# Patient Record
Sex: MALE
[De-identification: names, ages, dates, MRNs, and addresses within clinical notes are randomized per-mention and may not be internally consistent; named-entity substitution may affect disease eponyms.]

---

## 2022-07-06 ENCOUNTER — NURSE TRIAGE (OUTPATIENT)
Dept: OTHER | Facility: CLINIC | Age: 62
End: 2022-07-06

## 2022-07-06 NOTE — TELEPHONE ENCOUNTER
Subjective: Caller states \"I am scheduled to have a colonoscopy this morning. And the prep has not worked. \"     Colonoscopy scheduled for 0645. Notified physician @ 2200, suggested mag citrate. Drank that around 45 minutes-1 hour ago and 16oz water, 10oz clear chicken broth. Second prep is supposed to start 0230 - 0430     Current Symptoms: N/A    Onset: 1630 7/5/22 initiated prep    Associated Symptoms: constipation    Pain Severity: N/A    Temperature: N/A     What has been tried: mag citrate     LMP: NA Pregnant: NA    Recommended disposition: call pcp     Care advice provided, patient verbalizes understanding; denies any other questions or concerns; instructed to call back for any new or worsening symptoms. Patient/caller agrees to follow-up with PCP     This triage is a result of a call to [address] [place] [address]. Please do not respond to the triage nurse through this encounter. Any subsequent communication should be directly with the patient.     Reason for Disposition   Bowel prep for colonoscopy, questions about   [1] Caller has URGENT question or concern AND [2] triager unable to answer question    Protocols used: COLONOSCOPY SYMPTOMS AND QUESTIONS-ADULT-

## 2023-03-06 DIAGNOSIS — E11.9 TYPE 2 DIABETES MELLITUS WITHOUT COMPLICATION, WITHOUT LONG-TERM CURRENT USE OF INSULIN (MULTI): Primary | ICD-10-CM

## 2023-03-06 RX ORDER — DULAGLUTIDE 3 MG/.5ML
3 INJECTION, SOLUTION SUBCUTANEOUS
Qty: 6 ML | Refills: 3 | Status: SHIPPED | OUTPATIENT
Start: 2023-03-06 | End: 2023-10-31 | Stop reason: ALTCHOICE

## 2023-04-26 ENCOUNTER — OFFICE VISIT (OUTPATIENT)
Dept: PRIMARY CARE | Facility: CLINIC | Age: 63
End: 2023-04-26
Payer: MEDICARE

## 2023-04-26 ENCOUNTER — APPOINTMENT (OUTPATIENT)
Dept: LAB | Facility: LAB | Age: 63
End: 2023-04-26
Payer: MEDICARE

## 2023-04-26 VITALS
TEMPERATURE: 98.3 F | DIASTOLIC BLOOD PRESSURE: 65 MMHG | BODY MASS INDEX: 45.07 KG/M2 | HEIGHT: 64 IN | WEIGHT: 264 LBS | SYSTOLIC BLOOD PRESSURE: 124 MMHG | HEART RATE: 96 BPM

## 2023-04-26 DIAGNOSIS — R42 POSITIONAL LIGHTHEADEDNESS: Primary | ICD-10-CM

## 2023-04-26 DIAGNOSIS — R00.2 PALPITATIONS: ICD-10-CM

## 2023-04-26 PROBLEM — E66.01 MORBID OBESITY (MULTI): Status: ACTIVE | Noted: 2023-04-26

## 2023-04-26 PROBLEM — G47.33 OBSTRUCTIVE SLEEP APNEA: Status: ACTIVE | Noted: 2023-04-26

## 2023-04-26 PROBLEM — G47.01 INSOMNIA DUE TO MEDICAL CONDITION: Status: ACTIVE | Noted: 2023-04-26

## 2023-04-26 PROBLEM — N52.9 ERECTILE DYSFUNCTION: Status: ACTIVE | Noted: 2023-04-26

## 2023-04-26 PROBLEM — E78.00 HYPERCHOLESTEROLEMIA: Status: ACTIVE | Noted: 2023-04-26

## 2023-04-26 PROBLEM — G20.A1 PARKINSON'S DISEASE (MULTI): Status: ACTIVE | Noted: 2023-04-26

## 2023-04-26 PROBLEM — E11.9 DIABETES MELLITUS, TYPE 2 (MULTI): Status: ACTIVE | Noted: 2023-04-26

## 2023-04-26 PROBLEM — L71.9 ROSACEA: Status: ACTIVE | Noted: 2023-04-26

## 2023-04-26 PROBLEM — D12.6 ADENOMATOUS POLYP OF COLON: Status: ACTIVE | Noted: 2023-04-26

## 2023-04-26 PROBLEM — M54.16 LUMBAR RADICULOPATHY, CHRONIC: Status: ACTIVE | Noted: 2023-04-26

## 2023-04-26 LAB
ANION GAP IN SER/PLAS: 11 MMOL/L (ref 10–20)
BASOPHILS (10*3/UL) IN BLOOD BY AUTOMATED COUNT: 0.03 X10E9/L (ref 0–0.1)
BASOPHILS/100 LEUKOCYTES IN BLOOD BY AUTOMATED COUNT: 0.4 % (ref 0–2)
CALCIUM (MG/DL) IN SER/PLAS: 9.7 MG/DL (ref 8.6–10.6)
CARBON DIOXIDE, TOTAL (MMOL/L) IN SER/PLAS: 31 MMOL/L (ref 21–32)
CHLORIDE (MMOL/L) IN SER/PLAS: 102 MMOL/L (ref 98–107)
CREATININE (MG/DL) IN SER/PLAS: 1.35 MG/DL (ref 0.5–1.3)
EOSINOPHILS (10*3/UL) IN BLOOD BY AUTOMATED COUNT: 0.09 X10E9/L (ref 0–0.7)
EOSINOPHILS/100 LEUKOCYTES IN BLOOD BY AUTOMATED COUNT: 1.3 % (ref 0–6)
ERYTHROCYTE DISTRIBUTION WIDTH (RATIO) BY AUTOMATED COUNT: 13.7 % (ref 11.5–14.5)
ERYTHROCYTE MEAN CORPUSCULAR HEMOGLOBIN CONCENTRATION (G/DL) BY AUTOMATED: 32.9 G/DL (ref 32–36)
ERYTHROCYTE MEAN CORPUSCULAR VOLUME (FL) BY AUTOMATED COUNT: 91 FL (ref 80–100)
ERYTHROCYTES (10*6/UL) IN BLOOD BY AUTOMATED COUNT: 5.4 X10E12/L (ref 4.5–5.9)
GFR MALE: 59 ML/MIN/1.73M2
GLUCOSE (MG/DL) IN SER/PLAS: 74 MG/DL (ref 74–99)
HEMATOCRIT (%) IN BLOOD BY AUTOMATED COUNT: 49.3 % (ref 41–52)
HEMOGLOBIN (G/DL) IN BLOOD: 16.2 G/DL (ref 13.5–17.5)
IMMATURE GRANULOCYTES/100 LEUKOCYTES IN BLOOD BY AUTOMATED COUNT: 0.3 % (ref 0–0.9)
LEUKOCYTES (10*3/UL) IN BLOOD BY AUTOMATED COUNT: 7.1 X10E9/L (ref 4.4–11.3)
LYMPHOCYTES (10*3/UL) IN BLOOD BY AUTOMATED COUNT: 1.58 X10E9/L (ref 1.2–4.8)
LYMPHOCYTES/100 LEUKOCYTES IN BLOOD BY AUTOMATED COUNT: 22.3 % (ref 13–44)
MONOCYTES (10*3/UL) IN BLOOD BY AUTOMATED COUNT: 0.85 X10E9/L (ref 0.1–1)
MONOCYTES/100 LEUKOCYTES IN BLOOD BY AUTOMATED COUNT: 12 % (ref 2–10)
NEUTROPHILS (10*3/UL) IN BLOOD BY AUTOMATED COUNT: 4.53 X10E9/L (ref 1.2–7.7)
NEUTROPHILS/100 LEUKOCYTES IN BLOOD BY AUTOMATED COUNT: 63.7 % (ref 40–80)
NRBC (PER 100 WBCS) BY AUTOMATED COUNT: 0 /100 WBC (ref 0–0)
PLATELETS (10*3/UL) IN BLOOD AUTOMATED COUNT: 180 X10E9/L (ref 150–450)
POTASSIUM (MMOL/L) IN SER/PLAS: 4 MMOL/L (ref 3.5–5.3)
SODIUM (MMOL/L) IN SER/PLAS: 140 MMOL/L (ref 136–145)
UREA NITROGEN (MG/DL) IN SER/PLAS: 25 MG/DL (ref 6–23)

## 2023-04-26 PROCEDURE — 3074F SYST BP LT 130 MM HG: CPT | Performed by: INTERNAL MEDICINE

## 2023-04-26 PROCEDURE — 36415 COLL VENOUS BLD VENIPUNCTURE: CPT

## 2023-04-26 PROCEDURE — 85025 COMPLETE CBC W/AUTO DIFF WBC: CPT

## 2023-04-26 PROCEDURE — 80048 BASIC METABOLIC PNL TOTAL CA: CPT

## 2023-04-26 PROCEDURE — 3078F DIAST BP <80 MM HG: CPT | Performed by: INTERNAL MEDICINE

## 2023-04-26 PROCEDURE — 93000 ELECTROCARDIOGRAM COMPLETE: CPT | Performed by: INTERNAL MEDICINE

## 2023-04-26 PROCEDURE — 99214 OFFICE O/P EST MOD 30 MIN: CPT | Performed by: INTERNAL MEDICINE

## 2023-04-26 RX ORDER — PRAVASTATIN SODIUM 40 MG/1
40 TABLET ORAL DAILY
COMMUNITY
End: 2023-06-12 | Stop reason: SDUPTHER

## 2023-04-26 RX ORDER — PRAMIPEXOLE DIHYDROCHLORIDE 0.5 MG/1
1 TABLET ORAL 3 TIMES DAILY
COMMUNITY
Start: 2021-04-06

## 2023-04-26 RX ORDER — TADALAFIL 5 MG/1
5 TABLET ORAL DAILY PRN
COMMUNITY

## 2023-04-26 RX ORDER — CHROMIUM PICOLINATE 200 MCG
1 TABLET ORAL 3 TIMES DAILY
COMMUNITY
Start: 2018-10-01

## 2023-04-26 RX ORDER — AMANTADINE HYDROCHLORIDE 100 MG/1
1 TABLET ORAL 3 TIMES DAILY
COMMUNITY
Start: 2017-09-20

## 2023-04-26 RX ORDER — MAG HYDROX/ALUMINUM HYD/SIMETH 400-400-40
1 SUSPENSION, ORAL (FINAL DOSE FORM) ORAL 3 TIMES DAILY
COMMUNITY
Start: 2018-10-01 | End: 2023-10-23 | Stop reason: SDUPTHER

## 2023-04-26 RX ORDER — MIRABEGRON 50 MG/1
50 TABLET, FILM COATED, EXTENDED RELEASE ORAL DAILY
COMMUNITY
Start: 2023-03-08

## 2023-04-26 RX ORDER — ACETAMINOPHEN 500 MG
15 TABLET ORAL NIGHTLY PRN
COMMUNITY
Start: 2018-10-01

## 2023-04-26 RX ORDER — PRAMIPEXOLE DIHYDROCHLORIDE 0.12 MG/1
1 TABLET ORAL 3 TIMES DAILY
COMMUNITY
Start: 2021-04-06

## 2023-04-26 RX ORDER — TRIHEXYPHENIDYL HYDROCHLORIDE 2 MG/1
2 TABLET ORAL 3 TIMES DAILY
COMMUNITY
End: 2023-10-23 | Stop reason: DRUGHIGH

## 2023-04-26 RX ORDER — GABAPENTIN 300 MG/1
2 CAPSULE ORAL 2 TIMES DAILY
COMMUNITY
Start: 2019-02-11 | End: 2023-10-23 | Stop reason: DRUGHIGH

## 2023-04-26 RX ORDER — POLYETHYLENE GLYCOL 3350 17 G/17G
17 POWDER, FOR SOLUTION ORAL
COMMUNITY
Start: 2017-09-20

## 2023-04-26 RX ORDER — ZOLPIDEM TARTRATE 10 MG/1
10 TABLET ORAL NIGHTLY PRN
COMMUNITY
Start: 2022-10-17

## 2023-04-26 RX ORDER — ACETAMINOPHEN 500 MG
1 TABLET ORAL DAILY
COMMUNITY
Start: 2021-10-06

## 2023-04-26 RX ORDER — MULTIVIT-MIN/FA/LYCOPEN/LUTEIN .4-300-25
1 TABLET ORAL DAILY
COMMUNITY
Start: 2021-10-06

## 2023-04-26 RX ORDER — TACROLIMUS 1 MG/G
1 OINTMENT TOPICAL 2 TIMES DAILY PRN
COMMUNITY
Start: 2023-02-14 | End: 2023-10-23 | Stop reason: ALTCHOICE

## 2023-04-26 NOTE — PROGRESS NOTES
"Subjective   Patient ID: Steven Velasquez is a 63 y.o. male who presents for No chief complaint on file..    Steven is in because he has noticed 2 symptoms.    He has noticed increased lightheadedness upon arising from a bent or down position. This is occasional but more frequent than before. Duration is 5-10 seconds. No other triggers.    He has noticed for the past 4 weeks or so, that as he has climbed the stairs to get on his exercise bike that his heart is racing. Recovery is about 30 seconds. No CP. No arm pain or jaw pain.         Review of Systems   All other systems reviewed and are negative.      Objective   /65 (BP Location: Right arm, Patient Position: Standing, BP Cuff Size: Large adult)   Pulse 96   Temp 36.8 °C (98.3 °F) (Oral)   Ht 1.626 m (5' 4\")   Wt 120 kg (264 lb)   BMI 45.32 kg/m²     Physical Exam  Constitutional:       Appearance: Normal appearance. He is obese.   HENT:      Head: Normocephalic and atraumatic.   Cardiovascular:      Rate and Rhythm: Normal rate and regular rhythm.   Pulmonary:      Effort: Pulmonary effort is normal.      Breath sounds: Normal breath sounds.   Musculoskeletal:      Right lower leg: No edema.      Left lower leg: No edema.   Neurological:      Mental Status: He is alert.         Assessment/Plan   Diagnoses and all orders for this visit:  Positional lightheadedness  Palpitations  -     CBC and Auto Differential; Future  -     Basic Metabolic Panel; Future  -     ECG 12 lead    There is no evidence for orthostatic hypotension and the EKG is at baseline unchanged. No signs of HF. No angina. Will observe without treatment and consider further evaluation other than CBC and BMP if symptoms worsen.     "

## 2023-06-12 DIAGNOSIS — E78.00 HYPERCHOLESTEROLEMIA: Primary | ICD-10-CM

## 2023-06-12 RX ORDER — PRAVASTATIN SODIUM 40 MG/1
40 TABLET ORAL DAILY
Qty: 90 TABLET | Refills: 3 | Status: SHIPPED | OUTPATIENT
Start: 2023-06-12

## 2023-10-02 ENCOUNTER — LAB (OUTPATIENT)
Dept: LAB | Facility: LAB | Age: 63
End: 2023-10-02
Payer: MEDICARE

## 2023-10-02 DIAGNOSIS — Z00.00 PREVENTATIVE HEALTH CARE: ICD-10-CM

## 2023-10-02 DIAGNOSIS — E11.9 TYPE 2 DIABETES MELLITUS WITHOUT COMPLICATION, WITHOUT LONG-TERM CURRENT USE OF INSULIN (MULTI): Primary | ICD-10-CM

## 2023-10-02 DIAGNOSIS — E11.9 TYPE 2 DIABETES MELLITUS WITHOUT COMPLICATION, WITHOUT LONG-TERM CURRENT USE OF INSULIN (MULTI): ICD-10-CM

## 2023-10-02 DIAGNOSIS — Z12.5 PROSTATE CANCER SCREENING: ICD-10-CM

## 2023-10-02 LAB
ALBUMIN SERPL BCP-MCNC: 4.8 G/DL (ref 3.4–5)
ALP SERPL-CCNC: 61 U/L (ref 33–136)
ALT SERPL W P-5'-P-CCNC: 48 U/L (ref 10–52)
ANION GAP SERPL CALC-SCNC: 17 MMOL/L (ref 10–20)
AST SERPL W P-5'-P-CCNC: 34 U/L (ref 9–39)
BASOPHILS # BLD AUTO: 0.04 X10*3/UL (ref 0–0.1)
BASOPHILS NFR BLD AUTO: 0.4 %
BILIRUB SERPL-MCNC: 1.6 MG/DL (ref 0–1.2)
BUN SERPL-MCNC: 24 MG/DL (ref 6–23)
CALCIUM SERPL-MCNC: 10.3 MG/DL (ref 8.6–10.6)
CHLORIDE SERPL-SCNC: 100 MMOL/L (ref 98–107)
CHOLEST SERPL-MCNC: 228 MG/DL (ref 0–199)
CHOLESTEROL/HDL RATIO: 4
CO2 SERPL-SCNC: 29 MMOL/L (ref 21–32)
CREAT SERPL-MCNC: 1.27 MG/DL (ref 0.5–1.3)
EOSINOPHIL # BLD AUTO: 0.07 X10*3/UL (ref 0–0.7)
EOSINOPHIL NFR BLD AUTO: 0.7 %
ERYTHROCYTE [DISTWIDTH] IN BLOOD BY AUTOMATED COUNT: 13.7 % (ref 11.5–14.5)
EST. AVERAGE GLUCOSE BLD GHB EST-MCNC: 120 MG/DL
GFR SERPL CREATININE-BSD FRML MDRD: 64 ML/MIN/1.73M*2
GLUCOSE SERPL-MCNC: 102 MG/DL (ref 74–99)
HBA1C MFR BLD: 5.8 %
HCT VFR BLD AUTO: 52.9 % (ref 41–52)
HDLC SERPL-MCNC: 56.4 MG/DL
HGB BLD-MCNC: 17.4 G/DL (ref 13.5–17.5)
IMM GRANULOCYTES # BLD AUTO: 0.05 X10*3/UL (ref 0–0.7)
IMM GRANULOCYTES NFR BLD AUTO: 0.5 % (ref 0–0.9)
LDLC SERPL CALC-MCNC: 151 MG/DL (ref 140–190)
LYMPHOCYTES # BLD AUTO: 1.72 X10*3/UL (ref 1.2–4.8)
LYMPHOCYTES NFR BLD AUTO: 16.5 %
MCH RBC QN AUTO: 30.5 PG (ref 26–34)
MCHC RBC AUTO-ENTMCNC: 32.9 G/DL (ref 32–36)
MCV RBC AUTO: 93 FL (ref 80–100)
MONOCYTES # BLD AUTO: 0.93 X10*3/UL (ref 0.1–1)
MONOCYTES NFR BLD AUTO: 8.9 %
NEUTROPHILS # BLD AUTO: 7.64 X10*3/UL (ref 1.2–7.7)
NEUTROPHILS NFR BLD AUTO: 73 %
NON HDL CHOLESTEROL: 172 MG/DL (ref 0–149)
NRBC BLD-RTO: 0 /100 WBCS (ref 0–0)
PLATELET # BLD AUTO: 189 X10*3/UL (ref 150–450)
PMV BLD AUTO: 10.1 FL (ref 7.5–11.5)
POTASSIUM SERPL-SCNC: 4.5 MMOL/L (ref 3.5–5.3)
PROT SERPL-MCNC: 7.6 G/DL (ref 6.4–8.2)
PSA SERPL-MCNC: 0.8 NG/ML
RBC # BLD AUTO: 5.7 X10*6/UL (ref 4.5–5.9)
SODIUM SERPL-SCNC: 141 MMOL/L (ref 136–145)
TRIGL SERPL-MCNC: 101 MG/DL (ref 0–149)
VLDL: 20 MG/DL (ref 0–40)
WBC # BLD AUTO: 10.5 X10*3/UL (ref 4.4–11.3)

## 2023-10-02 PROCEDURE — 36415 COLL VENOUS BLD VENIPUNCTURE: CPT

## 2023-10-14 PROBLEM — M77.40 METATARSALGIA: Status: ACTIVE | Noted: 2023-10-14

## 2023-10-14 PROBLEM — M75.21 BICEPS TENDINITIS OF BOTH SHOULDERS: Status: ACTIVE | Noted: 2023-10-14

## 2023-10-14 PROBLEM — L40.0 PSORIASIS VULGARIS: Status: ACTIVE | Noted: 2018-10-23

## 2023-10-14 PROBLEM — M51.37 DEGENERATION OF INTERVERTEBRAL DISC OF LUMBOSACRAL REGION: Status: ACTIVE | Noted: 2020-11-20

## 2023-10-14 PROBLEM — G54.4 LUMBOSACRAL ROOT DISORDERS, NOT ELSEWHERE CLASSIFIED: Status: ACTIVE | Noted: 2023-10-14

## 2023-10-14 PROBLEM — R25.1 TREMOR: Status: ACTIVE | Noted: 2019-12-30

## 2023-10-14 PROBLEM — M47.26 OSTEOARTHRITIS OF SPINE WITH RADICULOPATHY, LUMBAR REGION: Status: ACTIVE | Noted: 2020-12-22

## 2023-10-14 PROBLEM — M51.36 DEGENERATIVE DISC DISEASE, LUMBAR: Status: ACTIVE | Noted: 2020-12-22

## 2023-10-14 PROBLEM — N40.0 BPH WITHOUT URINARY OBSTRUCTION: Status: ACTIVE | Noted: 2023-10-14

## 2023-10-14 PROBLEM — L27.0 GENERALIZED SKIN ERUPTION DUE TO DRUGS AND MEDICAMENTS TAKEN INTERNALLY: Status: ACTIVE | Noted: 2018-10-23

## 2023-10-14 PROBLEM — R35.89 POLYURIA: Status: ACTIVE | Noted: 2019-07-23

## 2023-10-14 PROBLEM — G54.2: Status: ACTIVE | Noted: 2023-10-14

## 2023-10-14 PROBLEM — M51.379 DEGENERATION OF INTERVERTEBRAL DISC OF LUMBOSACRAL REGION: Status: ACTIVE | Noted: 2020-11-20

## 2023-10-14 PROBLEM — R19.5 POSITIVE COLORECTAL CANCER SCREENING USING COLOGUARD TEST: Status: ACTIVE | Noted: 2023-10-14

## 2023-10-14 PROBLEM — T78.2XXA ANAPHYLACTIC SYNDROME: Status: ACTIVE | Noted: 2023-10-14

## 2023-10-14 PROBLEM — M51.369 DEGENERATIVE DISC DISEASE, LUMBAR: Status: ACTIVE | Noted: 2020-12-22

## 2023-10-14 PROBLEM — L29.9 PRURITUS, UNSPECIFIED: Status: ACTIVE | Noted: 2018-10-23

## 2023-10-14 PROBLEM — M51.26 HERNIATED LUMBAR INTERVERTEBRAL DISC: Status: ACTIVE | Noted: 2019-07-24

## 2023-10-14 PROBLEM — M10.9 GOUT: Status: ACTIVE | Noted: 2019-07-23

## 2023-10-14 PROBLEM — L21.8 OTHER SEBORRHEIC DERMATITIS: Status: ACTIVE | Noted: 2018-10-23

## 2023-10-14 PROBLEM — R35.0 URINARY FREQUENCY: Status: ACTIVE | Noted: 2023-10-14

## 2023-10-14 PROBLEM — E78.5 HYPERLIPIDEMIA: Status: ACTIVE | Noted: 2019-07-23

## 2023-10-14 PROBLEM — E78.00 PURE HYPERCHOLESTEROLEMIA: Status: ACTIVE | Noted: 2019-12-30

## 2023-10-14 PROBLEM — M48.061 SPINAL STENOSIS OF LUMBAR REGION: Status: ACTIVE | Noted: 2019-08-20

## 2023-10-14 PROBLEM — R73.03 PREDIABETES: Status: ACTIVE | Noted: 2019-07-23

## 2023-10-14 PROBLEM — N28.1 RENAL CYST: Status: ACTIVE | Noted: 2023-10-14

## 2023-10-14 PROBLEM — G56.30 RADIAL TUNNEL SYNDROME: Status: ACTIVE | Noted: 2023-10-14

## 2023-10-14 PROBLEM — M75.22 BICEPS TENDINITIS OF BOTH SHOULDERS: Status: ACTIVE | Noted: 2023-10-14

## 2023-10-14 PROBLEM — T50.995A ADVERSE EFFECT OF OTHER DRUGS, MEDICAMENTS AND BIOLOGICAL SUBSTANCES, INITIAL ENCOUNTER: Status: ACTIVE | Noted: 2023-10-14

## 2023-10-14 PROBLEM — M20.10 HALLUX VALGUS: Status: ACTIVE | Noted: 2023-10-14

## 2023-10-14 RX ORDER — BETAMETHASONE DIPROPIONATE 0.5 MG/G
1 CREAM TOPICAL
COMMUNITY
Start: 2018-03-23

## 2023-10-14 RX ORDER — FEXOFENADINE HCL AND PSEUDOEPHEDRINE HCI 180; 240 MG/1; MG/1
TABLET, EXTENDED RELEASE ORAL
COMMUNITY
Start: 2018-03-23 | End: 2023-10-23 | Stop reason: ALTCHOICE

## 2023-10-14 RX ORDER — TRIAMCINOLONE ACETONIDE 1 MG/G
CREAM TOPICAL 2 TIMES DAILY
COMMUNITY
Start: 2019-11-26

## 2023-10-14 RX ORDER — SULFACETAMIDE SODIUM 100 MG/ML
1 LOTION TOPICAL
COMMUNITY
Start: 2018-04-20 | End: 2024-05-03 | Stop reason: ALTCHOICE

## 2023-10-14 RX ORDER — UBIDECARENONE 30 MG/5 ML
LIQUID (ML) ORAL
COMMUNITY
Start: 2018-10-01 | End: 2023-10-23 | Stop reason: DRUGHIGH

## 2023-10-14 RX ORDER — HYDROCORTISONE 25 MG/G
1 CREAM TOPICAL
COMMUNITY
Start: 2018-04-20

## 2023-10-14 RX ORDER — ACETAMINOPHEN, DIPHENHYDRAMINE HCL, PHENYLEPHRINE HCL 325; 25; 5 MG/1; MG/1; MG/1
2 TABLET ORAL NIGHTLY
COMMUNITY
Start: 2018-10-01 | End: 2023-10-23 | Stop reason: DRUGHIGH

## 2023-10-14 RX ORDER — CLINDAMYCIN PHOSPHATE 10 UG/ML
1 LOTION TOPICAL
COMMUNITY
Start: 2018-06-07

## 2023-10-14 RX ORDER — DULOXETIN HYDROCHLORIDE 20 MG/1
20 CAPSULE, DELAYED RELEASE ORAL
COMMUNITY
Start: 2020-09-08 | End: 2023-10-23 | Stop reason: ALTCHOICE

## 2023-10-14 RX ORDER — CYCLOBENZAPRINE HCL 5 MG
5 TABLET ORAL 2 TIMES DAILY PRN
COMMUNITY

## 2023-10-14 RX ORDER — CELECOXIB 200 MG/1
200 CAPSULE ORAL
COMMUNITY
Start: 2019-11-26 | End: 2023-10-23 | Stop reason: SDUPTHER

## 2023-10-14 RX ORDER — CICLOPIROX 1 G/100ML
1 SHAMPOO TOPICAL EVERY OTHER DAY
COMMUNITY
Start: 2019-11-29 | End: 2024-05-03 | Stop reason: ALTCHOICE

## 2023-10-14 RX ORDER — DULAGLUTIDE 1.5 MG/.5ML
INJECTION, SOLUTION SUBCUTANEOUS
COMMUNITY
Start: 2023-02-04 | End: 2023-10-17 | Stop reason: ALTCHOICE

## 2023-10-14 RX ORDER — MELOXICAM 15 MG/1
15 TABLET ORAL
COMMUNITY
End: 2023-10-23 | Stop reason: ALTCHOICE

## 2023-10-14 RX ORDER — DULAGLUTIDE 0.75 MG/.5ML
0.75 INJECTION, SOLUTION SUBCUTANEOUS WEEKLY
COMMUNITY
End: 2023-10-17 | Stop reason: ALTCHOICE

## 2023-10-14 RX ORDER — KETOCONAZOLE 20 MG/ML
5 SHAMPOO, SUSPENSION TOPICAL
COMMUNITY
Start: 2022-04-18

## 2023-10-14 RX ORDER — PRAMIPEXOLE DIHYDROCHLORIDE 0.25 MG/1
TABLET ORAL
COMMUNITY
Start: 2017-09-05 | End: 2023-10-17 | Stop reason: ALTCHOICE

## 2023-10-14 RX ORDER — METRONIDAZOLE 10 MG/G
1 GEL TOPICAL
COMMUNITY
Start: 2018-07-24

## 2023-10-14 RX ORDER — MIRABEGRON 25 MG/1
1 TABLET, FILM COATED, EXTENDED RELEASE ORAL DAILY
COMMUNITY
Start: 2023-01-31 | End: 2023-10-23 | Stop reason: DRUGHIGH

## 2023-10-14 RX ORDER — NIACINAMIDE 100 %
POWDER (GRAM) MISCELLANEOUS
COMMUNITY
Start: 2018-10-01 | End: 2023-10-23 | Stop reason: WASHOUT

## 2023-10-17 ENCOUNTER — CONSULT (OUTPATIENT)
Dept: ALLERGY | Facility: CLINIC | Age: 63
End: 2023-10-17
Payer: MEDICARE

## 2023-10-17 VITALS — DIASTOLIC BLOOD PRESSURE: 91 MMHG | BODY MASS INDEX: 43.1 KG/M2 | WEIGHT: 257 LBS | SYSTOLIC BLOOD PRESSURE: 138 MMHG

## 2023-10-17 DIAGNOSIS — R21 RASH: ICD-10-CM

## 2023-10-17 DIAGNOSIS — L23.89 ALLERGIC CONTACT DERMATITIS DUE TO OTHER AGENTS: Primary | ICD-10-CM

## 2023-10-17 PROCEDURE — 99204 OFFICE O/P NEW MOD 45 MIN: CPT | Performed by: ALLERGY & IMMUNOLOGY

## 2023-10-17 NOTE — PROGRESS NOTES
Subjective   Patient ID:   [id number]   Steven Velasquez is a 63 y.o. male who presents for Allergy Testing.    Chief Complaint   Patient presents with    Allergy Testing          HPI  This patient is here to evaluate for:    Reports having rash various parts of his body  Saw Dr. Mckeon in [place] and was treated with several creams including steroid creams and others (see med list).  Unfortunately, the rash persisted and so he was sent for Allergy patch testing    He was sent to [facility] but, unfortunately, did not have a positive experience and is not confident in the diagnosis or management.   Rash is persistent.    He brings in his allergy list and it is to [facility]/MI (these 2 chemicals are essentially in the same class are are typically both avoided in this allergy)  It appears that 80 tests were performed, from a picture he brings in.      Review of Systems   All other systems reviewed and are negative.        Objective     BP (!) 138/91 (BP Location: Left arm, Patient Position: Sitting, BP Cuff Size: Large adult)   Wt 117 kg (257 lb)   BMI 43.10 kg/m²      Physical Exam  Constitutional:       General: He is not in acute distress.     Appearance: Normal appearance. He is not ill-appearing.   HENT:      Head: Normocephalic and atraumatic.      Right Ear: Tympanic membrane, ear canal and external ear normal.      Left Ear: Tympanic membrane, ear canal and external ear normal.      Nose: Nose normal. No congestion or rhinorrhea.      Mouth/Throat:      Mouth: Mucous membranes are moist.      Pharynx: Oropharynx is clear. No oropharyngeal exudate or posterior oropharyngeal erythema.   Eyes:      General:         Right eye: No discharge.         Left eye: No discharge.      Conjunctiva/sclera: Conjunctivae normal.   Cardiovascular:      Rate and Rhythm: Normal rate and regular rhythm.      Heart sounds: Normal heart sounds. No murmur heard.     No friction rub. No gallop.   Pulmonary:      Effort: Pulmonary effort  is normal. No respiratory distress.      Breath sounds: Normal breath sounds. No stridor. No wheezing, rhonchi or rales.   Chest:      Chest wall: No tenderness.   Abdominal:      General: Abdomen is flat.      Palpations: Abdomen is soft.   Musculoskeletal:         General: Normal range of motion.      Cervical back: Normal range of motion and neck supple.   Lymphadenopathy:      Cervical: No cervical adenopathy.   Skin:     General: Skin is warm and dry.      Findings: Rash present. No erythema or lesion.   Neurological:      General: No focal deficit present.      Mental Status: He is alert. Mental status is at baseline.   Psychiatric:         Mood and Affect: Mood normal.         Behavior: Behavior normal.         Thought Content: Thought content normal.         Judgment: Judgment normal.            Current Outpatient Medications   Medication Sig Dispense Refill    amantadine (Symmetrel) 100 mg tablet Take 1 tablet (100 mg) by mouth 3 times a day.      betamethasone dipropionate 0.05 % cream 1 Application.      celecoxib (CeleBREX) 200 mg capsule Take 1 capsule (200 mg) by mouth once daily.      cholecalciferol (Vitamin D-3) 5,000 Units tablet Take 1 tablet (5,000 Units) by mouth once daily.      chromium picolinate 200 mcg tablet tablet Take 1 tablet (200 mcg) by mouth 3 times a day.      ciclopirox 1 % shampoo Apply 1 Application topically every other day. TO FACE AND SCALP      clindamycin (Cleocin T) 1 % lotion 1 Application.      cyclobenzaprine (Flexeril) 5 mg tablet Take 1 tablet (5 mg) by mouth 2 times a day as needed.      dulaglutide (Trulicity) 3 mg/0.5 mL pen injector Inject 3 mg under the skin every 7 days. 6 mL 3    DULoxetine (Cymbalta) 20 mg DR capsule Take 1 capsule (20 mg) by mouth once daily.      fexofenadine-pseudoephedrine (Allegra-D 24 Hour) 180-240 mg 24 hr tablet 1 Tablet      gabapentin (Neurontin) 300 mg capsule Take 2 capsules (600 mg) by mouth 2 times a day. 900mg at bedtime and  300mg qAM      hydrocortisone 2.5 % cream 1 Application.      ketoconazole (NIZOral) 2 % shampoo Apply 0.5 Applications topically. TO FOREHEAD 2-3 TIMES A WEEK      melatonin 10 mg tablet Take 2 tablets (20 mg) by mouth once daily at bedtime.      melatonin 5 mg tablet Take 3 tablets (15 mg) by mouth as needed at bedtime for sleep.      meloxicam (Mobic) 15 mg tablet Take 1 tablet (15 mg) by mouth once daily.      metroNIDAZOLE (Metrogel) 1 % gel 1 Application.      multivit-iron-minerals-folic acid (Centrum Silver) 0.4 mg-300 mcg- 250 mcg tab Take 1 tablet by mouth once daily.      Myrbetriq 25 mg tablet extended release 24 hr 24 hr tablet Take 1 tablet (25 mg) by mouth once daily.      Myrbetriq 50 mg tablet extended release 24 hr 24 hr tablet Take 1 tablet (50 mg) by mouth once daily.      niacinamide, bulk, 100 % powder Nicotinamide Powder Nicotinamide Powder USE AS DIRECTED. Refills: 0 Lino NAIDU, Giorgio KENNEDY Start : 1-Oct-2018 Active 10- Giorgio [facility]yn[facility] ([id number])      nicotinamide riboside chloride (NICOTINAMIDE RIBOSIDE, BULK, MISC) Take 300 mg by mouth once daily.      polyethylene glycol (Glycolax) 17 gram/dose powder 17 g once daily.      pramipexole (Mirapex) 0.125 mg tablet Take 1 tablet (0.125 mg) by mouth 3 times a day.      pramipexole (Mirapex) 0.5 mg tablet Take 1 tablet (0.5 mg) by mouth 3 times a day.      pravastatin (Pravachol) 40 mg tablet Take 1 tablet (40 mg) by mouth once daily. 90 tablet 3    saw palmetto 450 mg capsule Take 1 capsule (450 mg) by mouth 3 times a day.      sulfacetamide suspension (Klaron) 10 % suspension topical 1 Application.      tacrolimus (Protopic) 0.1 % ointment Apply 1 Application topically 2 times a day as needed.      tadalafil (Cialis) 5 mg tablet Take 1 tablet (5 mg) by mouth once daily as needed for erectile dysfunction.      triamcinolone (Kenalog) 0.1 % cream Apply topically 2 times a day. to elbows and fingertips       trihexyphenidyl (Artane) 2 mg tablet Take 1 tablet (2 mg) by mouth 3 times a day.      ubidecarenone (coenzyme Q10) 30 mg/5 mL liquid Take by mouth.      zolpidem (Ambien) 10 mg tablet Take 1 tablet (10 mg) by mouth as needed at bedtime for sleep.       No current facility-administered medications for this visit.       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 I discussed that this technology will help him to avoid the chemicals [facility].  Also, I agree with you that it's difficult to avoid this chemical outside the home. Therefore, it is important to carry one's own soap/shampoo/etc.     I would like you to use the [place] [employer] Contact Dermatitis site on your computer or phone. This can help you find safe products that do not contain the contact allergens to which you reacted. We sent you an email link, as you requested, so you can access the site. It has been personalized to the your specific allergies.     My hope is that with the information above and being able to avoid the chemicals that caused allergy, his rash will dissipate and then disappear over the next month. It can take up to 4 weeks after allergen avoidance to fully clear.     I understand your concern and preference to have the patch testing performed again. There were some questionable reactions from the picture I saw; but it's not possible for me to diagnose without seeing the test in person. Particularly, if the above measures do not help, I fully agree with you.    Instructions for patch testing: The patient understands that the patch test cannot become wet so showers and heavy exercise need to be avoided. Also, please do not use any topical or oral steroids like prednisone as this can interfere with the testing results. But antihistamines for itching can be used. You should bring in any of your own creams,  products, etc. that you would like us to also patch test.    He will schedule this procedure at his convenience.      Also, if you to come into contact with the allergen and develop symptoms/rash, please contact my office so we can discuss the appropriate treatment.    Power Woodall MD

## 2023-10-17 NOTE — LETTER
Thank you very much for sending your patient for evaluation. If you have any questions or other concerns please let me know.     Best regards, Sushant

## 2023-10-23 ENCOUNTER — OFFICE VISIT (OUTPATIENT)
Dept: PRIMARY CARE | Facility: CLINIC | Age: 63
End: 2023-10-23
Payer: MEDICARE

## 2023-10-23 VITALS
SYSTOLIC BLOOD PRESSURE: 132 MMHG | DIASTOLIC BLOOD PRESSURE: 83 MMHG | HEART RATE: 72 BPM | BODY MASS INDEX: 44.25 KG/M2 | HEIGHT: 64 IN | WEIGHT: 259.2 LBS | TEMPERATURE: 97.1 F

## 2023-10-23 DIAGNOSIS — Z00.00 ROUTINE GENERAL MEDICAL EXAMINATION AT HEALTH CARE FACILITY: Primary | ICD-10-CM

## 2023-10-23 DIAGNOSIS — D12.6 ADENOMATOUS POLYP OF COLON, UNSPECIFIED PART OF COLON: ICD-10-CM

## 2023-10-23 DIAGNOSIS — G20.A1 PARKINSON'S DISEASE WITHOUT DYSKINESIA OR FLUCTUATING MANIFESTATIONS (MULTI): ICD-10-CM

## 2023-10-23 DIAGNOSIS — G47.33 OBSTRUCTIVE SLEEP APNEA: ICD-10-CM

## 2023-10-23 DIAGNOSIS — E11.9 TYPE 2 DIABETES MELLITUS WITHOUT COMPLICATION, WITHOUT LONG-TERM CURRENT USE OF INSULIN (MULTI): ICD-10-CM

## 2023-10-23 DIAGNOSIS — E78.00 PURE HYPERCHOLESTEROLEMIA: ICD-10-CM

## 2023-10-23 PROCEDURE — 3079F DIAST BP 80-89 MM HG: CPT | Performed by: INTERNAL MEDICINE

## 2023-10-23 PROCEDURE — G0439 PPPS, SUBSEQ VISIT: HCPCS | Performed by: INTERNAL MEDICINE

## 2023-10-23 PROCEDURE — 3044F HG A1C LEVEL LT 7.0%: CPT | Performed by: INTERNAL MEDICINE

## 2023-10-23 PROCEDURE — 3075F SYST BP GE 130 - 139MM HG: CPT | Performed by: INTERNAL MEDICINE

## 2023-10-23 PROCEDURE — 3050F LDL-C >= 130 MG/DL: CPT | Performed by: INTERNAL MEDICINE

## 2023-10-23 RX ORDER — MAG HYDROX/ALUMINUM HYD/SIMETH 400-400-40
3 SUSPENSION, ORAL (FINAL DOSE FORM) ORAL 3 TIMES DAILY
Start: 2023-10-23

## 2023-10-23 RX ORDER — TETRACYCLINE HCL 500 MG
1000 CAPSULE ORAL 3 TIMES DAILY
COMMUNITY
End: 2023-10-23 | Stop reason: DRUGHIGH

## 2023-10-23 RX ORDER — GABAPENTIN 300 MG/1
CAPSULE ORAL
Qty: 360 CAPSULE | Refills: 1 | Status: SHIPPED | OUTPATIENT
Start: 2023-10-23 | End: 2024-01-15 | Stop reason: SDUPTHER

## 2023-10-23 RX ORDER — EPINEPHRINE 0.22MG
100 AEROSOL WITH ADAPTER (ML) INHALATION ONCE
Status: SHIPPED | OUTPATIENT
Start: 2023-10-23

## 2023-10-23 RX ORDER — CELECOXIB 200 MG/1
200 CAPSULE ORAL DAILY PRN
Qty: 90 CAPSULE | Refills: 3
Start: 2023-10-23 | End: 2024-10-22

## 2023-10-23 RX ORDER — TRIHEXYPHENIDYL HYDROCHLORIDE 2 MG/1
1 TABLET ORAL 3 TIMES DAILY
Qty: 45 TABLET | Refills: 3 | Status: SHIPPED | OUTPATIENT
Start: 2023-10-23 | End: 2024-02-26 | Stop reason: SDUPTHER

## 2023-10-23 RX ORDER — EPINEPHRINE 0.22MG
100 AEROSOL WITH ADAPTER (ML) INHALATION DAILY
Qty: 30 CAPSULE | Refills: 11
Start: 2023-10-23 | End: 2024-10-22

## 2023-10-23 RX ORDER — TETRACYCLINE HCL 500 MG
500 CAPSULE ORAL 3 TIMES DAILY
Status: SHIPPED
Start: 2023-10-23

## 2023-10-23 ASSESSMENT — ACTIVITIES OF DAILY LIVING (ADL)
GROCERY_SHOPPING: INDEPENDENT
BATHING: INDEPENDENT
TAKING_MEDICATION: INDEPENDENT
MANAGING_FINANCES: INDEPENDENT
DOING_HOUSEWORK: INDEPENDENT
DRESSING: INDEPENDENT

## 2023-10-23 ASSESSMENT — PATIENT HEALTH QUESTIONNAIRE - PHQ9
2. FEELING DOWN, DEPRESSED OR HOPELESS: NOT AT ALL
SUM OF ALL RESPONSES TO PHQ9 QUESTIONS 1 AND 2: 0
1. LITTLE INTEREST OR PLEASURE IN DOING THINGS: NOT AT ALL

## 2023-10-23 NOTE — PROGRESS NOTES
"Subjective   Patient ID: Steven Velasquez is a 63 y.o. male who presents for Annual Exam (Pt present today for physical. ls).  Steven is here for his annual PSE.    A little less urinary urgency. Sleeping through the night. Follows with Dr. Pagan who does not think he has BPH.  Complaint with CPAP nightly.    Has noticed a few changes due to PD. His balance is not as good. NO falls. Some retropulsion. Sees [facility] q 3 months. Tremor is worse with stress. Steven notes a bit more \"Sticky feet:. He is conscious of how his memory is. Kp judges the PD to be remarkably stable.          Current Outpatient Medications   Medication Instructions    amantadine (Symmetrel) 100 mg tablet 1 tablet, oral, 3 times daily    betamethasone dipropionate 0.05 % cream 1 Application    celecoxib (CELEBREX) 200 mg, oral, Daily RT    cholecalciferol (Vitamin D-3) 5,000 Units tablet 1 tablet, oral, Daily    chromium picolinate 200 mcg tablet tablet 1 tablet, oral, 3 times daily    ciclopirox 1 % shampoo 1 Application, Topical, Every other day, TO FACE AND SCALP    clindamycin (Cleocin T) 1 % lotion 1 Application    cyclobenzaprine (FLEXERIL) 5 mg, oral, 2 times daily PRN    DULoxetine (CYMBALTA) 20 mg, oral, Daily RT    fexofenadine-pseudoephedrine (Allegra-D 24 Hour) 180-240 mg 24 hr tablet 1 Tablet    gabapentin (Neurontin) 300 mg capsule 2 capsules, oral, 2 times daily, 900mg at bedtime and 300mg qAM    hydrocortisone 2.5 % cream 1 Application    ketoconazole (NIZOral) 2 % shampoo 5 mL, Topical, TO FOREHEAD 2-3 TIMES A WEEK    melatonin 10 mg tablet 2 tablets, oral, Nightly    melatonin 15 mg, oral, Nightly PRN    meloxicam (MOBIC) 15 mg, oral, Daily RT    metroNIDAZOLE (Metrogel) 1 % gel 1 Application    multivit-iron-minerals-folic acid (Centrum Silver) 0.4 mg-300 mcg- 250 mcg tab 1 tablet, oral, Daily    Myrbetriq 25 mg tablet extended release 24 hr 24 hr tablet 1 tablet, oral, Daily    Myrbetriq 50 mg, oral, Daily    niacinamide, " bulk, 100 % powder Nicotinamide Powder Nicotinamide Powder USE AS DIRECTED. Refills: 0 Lino NAIDU, Giorgio KENNEDY Start : 1-Oct-2018 Active 10- Giorgio Huyn[facility] ([id number])    nicotinamide riboside chloride (NICOTINAMIDE RIBOSIDE, BULK, MISC) 300 mg, oral, Daily    polyethylene glycol (GLYCOLAX, MIRALAX) 17 g, Daily RT    pramipexole (Mirapex) 0.125 mg tablet 1 tablet, oral, 3 times daily    pramipexole (Mirapex) 0.5 mg tablet 1 tablet, oral, 3 times daily    pravastatin (PRAVACHOL) 40 mg, oral, Daily    saw palmetto 450 mg capsule 1 capsule, oral, 3 times daily    sulfacetamide suspension (Klaron) 10 % suspension topical 1 Application    tacrolimus (Protopic) 0.1 % ointment 1 Application, Topical, 2 times daily PRN    tadalafil (CIALIS) 5 mg, oral, Daily PRN    triamcinolone (Kenalog) 0.1 % cream Topical, 2 times daily, to elbows and fingertips    trihexyphenidyl (ARTANE) 2 mg, oral, 3 times daily    Trulicity 3 mg, subcutaneous, Every 7 days    ubidecarenone (coenzyme Q10) 30 mg/5 mL liquid oral    zolpidem (AMBIEN) 10 mg, oral, Nightly PRN     Review of Systems  General: Denies weakness, fever, anorexia. Denies significant change in weight.  HEENT: Denies HA, vision change or problem, hearing loss or tinnitus, voice or swallowing problems.  Resp: Denies SOB, cough, or wheezing.  CV: Denies chest pain or pressure, palpitations, syncope, edema, or claudication. Occasionally thinks R foot may swell.  GI : Denies abdominal pain, diarrhea, constipation, heartburn, rectal bleeding, or change in bowel habits. Generally constipated. Stools q 2-3 days. Occasional enema.  : Denies urinary frequency, pain, blood, incontinence, or nocturia.  Sexual: No sexual health or reproductive system concerns.  MSK: Denies significant musculoskeletal pains or limitations EXCEPT AS FOLLOWS...lower back pain  Neuro: Denies tingling, numbness, weakness, tremor, balance problems, falls, or memory  "loss. SEE HPI.  Psych: Denies Mood or sleep issues. SEE HPI.  Derm: No unusual lesions, moles or rashes.    Objective   Lab Results   Component Value Date    PSA 0.87 10/04/2022    PSA 0.88 10/06/2021    PSA 0.91 10/01/2020       Chemistry    Lab Results   Component Value Date/Time     10/02/2023 1146    K 4.5 10/02/2023 1146     10/02/2023 1146    CO2 29 10/02/2023 1146    BUN 24 (H) 10/02/2023 1146    CREATININE 1.27 10/02/2023 1146    Lab Results   Component Value Date/Time    CALCIUM 10.3 10/02/2023 1146    ALKPHOS 61 10/02/2023 1146    AST 34 10/02/2023 1146    ALT 48 10/02/2023 1146    BILITOT 1.6 (H) 10/02/2023 1146         Lab Results   Component Value Date    WBC 10.5 10/02/2023    HGB 17.4 10/02/2023    HCT 52.9 (H) 10/02/2023    MCV 93 10/02/2023     10/02/2023     Lab Results   Component Value Date    CHOL 228 (H) 10/02/2023    CHOL 168 10/04/2022    CHOL 166 06/16/2022     Lab Results   Component Value Date    HDL 56.4 10/02/2023    HDL 47.7 10/04/2022    HDL 48.1 06/16/2022     Lab Results   Component Value Date    LDLCALC 151 10/02/2023     Lab Results   Component Value Date    TRIG 101 10/02/2023    TRIG 101 10/04/2022    TRIG 118 06/16/2022     No components found for: \"CHOLHDL\"     Physical Exam  Constitutional: Alert and in no acute distress  Inspection of Eyes: Sclera and conjunctivae normal.  Pupil exam: PERRL. EOMI.  Tympanic membranes are normal. External ears appear normal.   Oropharynx: Normal with MMM.   Neck: Thyroid normal. No cervical lymphadenopathy. No carotid bruit.  No cervical, axillary, or inguinal lymphadenopathy.  Breasts: No masses.   Lungs are clear to auscultation and percussion.  Cardiac: S1 and S2 are normal. No murmurs, rubs, or gallops. No edema.   Abdomen: Soft, nontender. Normal bowel sounds. No hepatosplenomegaly or masses.  : Penis and testes are normal. No inguinal hernias.  Musculoskeletal: Examination of gait is normal. No clubbing or cyanosis. " Full range of motion of joints. NO swelling, tenderness, or limitation of motion.  Skin increased pigmentation in B shins. No visible rash. Nml skin turgor.  Neurological: Cranial nerves II-XII intact. Deep tendon reflexes 2+ and symmetric. No focal motor weakness. Sensation and vibration are normal. No pronator drift. Coordination normal. Balance normal. Tremor is mild.  Psychiatric: A&Ox3. Mood and affect normal.      Assessment/Plan   Problem List Items Addressed This Visit             ICD-10-CM    Diabetes mellitus, type 2 (CMS/HCC) E11.9     Try to switch to Mounjaro. Pharmacy referral.         Relevant Orders    Follow Up In [facility] - Pharmacy    Pure hypercholesterolemia E78.00     Look for lowering with weight loss and better attention to diet.         Adenomatous polyp of colon D12.6     Last colonoscopy 2022         Obstructive sleep apnea G47.33     Compliant with therapy. Continue.         Parkinson's disease G20.A1     Treated by [facility] Cardenas.          Other Visit Diagnoses         Codes    Routine general medical examination at health care facility    -  Primary Z00.00    Relevant Medications    celecoxib (CeleBREX) 200 mg capsule    saw palmetto 450 mg capsule    coenzyme Q-10 capsule 100 mg (Start on 10/23/2023 11:00 AM)    co-enzyme Q-10 100 mg capsule    Other Relevant Orders    Follow Up In [facility] - Pharmacy

## 2023-10-27 ENCOUNTER — APPOINTMENT (OUTPATIENT)
Dept: PHARMACY | Facility: HOSPITAL | Age: 63
End: 2023-10-27
Payer: MEDICARE

## 2023-10-31 ENCOUNTER — TELEMEDICINE (OUTPATIENT)
Dept: PHARMACY | Facility: HOSPITAL | Age: 63
End: 2023-10-31
Payer: MEDICARE

## 2023-10-31 DIAGNOSIS — E11.9 TYPE 2 DIABETES MELLITUS WITHOUT COMPLICATION, WITHOUT LONG-TERM CURRENT USE OF INSULIN (MULTI): ICD-10-CM

## 2023-10-31 DIAGNOSIS — Z00.00 ROUTINE GENERAL MEDICAL EXAMINATION AT HEALTH CARE FACILITY: ICD-10-CM

## 2023-10-31 RX ORDER — TIRZEPATIDE 5 MG/.5ML
5 INJECTION, SOLUTION SUBCUTANEOUS
Qty: 2 ML | Refills: 1 | Status: SHIPPED | OUTPATIENT
Start: 2023-10-31 | End: 2023-11-09 | Stop reason: ALTCHOICE

## 2023-10-31 RX ORDER — ONDANSETRON 4 MG/1
4 TABLET, FILM COATED ORAL EVERY 8 HOURS PRN
Qty: 30 TABLET | Refills: 0 | Status: SHIPPED | OUTPATIENT
Start: 2023-10-31 | End: 2023-12-11 | Stop reason: ALTCHOICE

## 2023-10-31 NOTE — ASSESSMENT & PLAN NOTE
Patients diabetes is well controlled with most recent A1c of 5.8% (Goal < 7%).   STOP: Trulicity 3 mg weekly  START: Mounjaro 5 mg weekly  Mounjaro Education:   - Counseled patient on MOA, expectations, side effects, duration of therapy, contraindications, administration, and monitoring parameters  - Answered all patient questions and concerns  - Counseled patient on Mounjaro MOA, expectations, side effects, duration of therapy, administration, and monitoring parameters.  - Provided detailed dosing and administration counseling to ensure proper technique.   - Reviewed Mounjaro titration schedule, starting with 5 mg once weekly to a goal of 15 mg once weekly if tolerated  - Counseled patient on the benefits of GLP-1ra glycemic control and weight loss  - Reviewed storage requirements of Mounjaro when not in use, and when to administer the medication if a dose is missed.  - Advised patient that they may experience improved satiety after meals and portion sizes of meals may be reduced as doses of Mounjaro increase.   Patient is very concerned about potential GI side effects with Mounjaro. He already deals with constipation and doesn't want this to worsen. We went through every scenario and came up with a plan. If he's feeling nauseous, I sent a short supply of zofran he can take to get through that. If his constipation worsens, he should continue Miralax and add docusate OTC if needed. If he has significant diarrhea, he can take immodium OTC. I recommended he also avoid greasy, fatty, spicy foods to avoid indigestion or potential nausea.  In terms of coverage, he is currently past the donut hole in catastrophic coverage. He is getting conflicting information about coverage starting in the new year. He wants to start the Mounjaro still and we will see what coverage looks like come January.  Compliance at present is estimated to be excellent.   Follow-up: 11/8 10am

## 2023-10-31 NOTE — PROGRESS NOTES
Pharmacist Clinic: Diabetes Management  Steven Velasquez is a 63 y.o. male was referred to Clinical Pharmacy Team for diabetes management.   Referring Provider: Giorgio Huynh MD     Subjective   HPI    HPI    - Patient's DM is controlled, but not having significant weight loss benefit with trulicity  - Submitted PA to insurance for Mounjaro and was approved for $48.31/month in catastrophic coverage  - Patient is concerned about a couple things: side effects with Mounjaro and coverage starting the new year. Both very valid concerns!    Weight loss: currently at 258 lbs; goal is 20% weight loss    Current monitoring regimen:   Patient is using:  none    Reported blood sugars: none    Any episodes of hypoglycemia? no    Adverse Effects:   none    Allergies   Allergen Reactions    Methocarbamol Hives    Primidone Other     Fatigue     Objective     There were no vitals taken for this visit.    Diabetes Pharmacotherapy:    Trulicity 3 mg weekly    SECONDARY PREVENTION  - Statin? Yes   - ACE-I/ARB? No  - Aspirin? No    Pertinent PMH Review:  - PMH of Pancreatitis: No  - PMH of Retinopathy: No  - PMH of Urinary Tract Infections: No  - PMH of MTC: No    Lab Review  Lab Results   Component Value Date    BILITOT 1.6 (H) 10/02/2023    CALCIUM 10.3 10/02/2023    CO2 29 10/02/2023     10/02/2023    CREATININE 1.27 10/02/2023    GLUCOSE 102 (H) 10/02/2023    ALKPHOS 61 10/02/2023    K 4.5 10/02/2023    PROT 7.6 10/02/2023     10/02/2023    AST 34 10/02/2023    ALT 48 10/02/2023    BUN 24 (H) 10/02/2023    ANIONGAP 17 10/02/2023    ALBUMIN 4.8 10/02/2023    GFRMALE 59 (A) 04/26/2023     Lab Results   Component Value Date    TRIG 101 10/02/2023    CHOL 228 (H) 10/02/2023    HDL 56.4 10/02/2023     Lab Results   Component Value Date    HGBA1C 5.8 (H) 10/02/2023    HGBA1C 5.6 06/16/2022    HGBA1C 5.7 (A) 03/17/2022     The 10-year ASCVD risk score (Zuly CAST, et al., 2019) is: 20.1%    Values used to calculate the  score:      Age: 63 years      Sex: Male      Is Non- : No      Diabetic: Yes      Tobacco smoker: No      Systolic Blood Pressure: 132 mmHg      Is BP treated: No      HDL Cholesterol: 56.4 mg/dL      Total Cholesterol: 228 mg/dL    Drug Interactions:  none    Assessment/Plan   Problem List Items Addressed This Visit       Diabetes mellitus, type 2 (CMS/[facility])     Patients diabetes is well controlled with most recent A1c of 5.8% (Goal < 7%).   STOP: Trulicity 3 mg weekly  START: Mounjaro 5 mg weekly  Mounjaro Education:   - Counseled patient on MOA, expectations, side effects, duration of therapy, contraindications, administration, and monitoring parameters  - Answered all patient questions and concerns  - Counseled patient on Mounjaro MOA, expectations, side effects, duration of therapy, administration, and monitoring parameters.  - Provided detailed dosing and administration counseling to ensure proper technique.   - Reviewed Mounjaro titration schedule, starting with 5 mg once weekly to a goal of 15 mg once weekly if tolerated  - Counseled patient on the benefits of GLP-1ra glycemic control and weight loss  - Reviewed storage requirements of Mounjaro when not in use, and when to administer the medication if a dose is missed.  - Advised patient that they may experience improved satiety after meals and portion sizes of meals may be reduced as doses of Mounjaro increase.   Patient is very concerned about potential GI side effects with Mounjaro. He already deals with constipation and doesn't want this to worsen. We went through every scenario and came up with a plan. If he's feeling nauseous, I sent a short supply of zofran he can take to get through that. If his constipation worsens, he should continue Miralax and add docusate OTC if needed. If he has significant diarrhea, he can take immodium OTC. I recommended he also avoid greasy, fatty, spicy foods to avoid indigestion or potential  nausea.  In terms of coverage, he is currently past the donut hole in catastrophic coverage. He is getting conflicting information about coverage starting in the new year. He wants to start the Mounjaro still and we will see what coverage looks like come January.  Compliance at present is estimated to be excellent.   Follow-up: 11/8 10am         Relevant Medications    tirzepatide (Mounjaro) 5 mg/0.5 mL pen injector    ondansetron (Zofran) 4 mg tablet    Other Relevant Orders    Follow Up In Advanced [facility] - Pharmacy     Other Visit Diagnoses       Routine general medical examination at health care facility            Maile Nelson, PharmD, [facility]  Clinical Pharmacist     Continue all meds under the continuation of care with the referring provider and clinical pharmacy team.

## 2023-11-08 ENCOUNTER — TELEMEDICINE (OUTPATIENT)
Dept: PHARMACY | Facility: HOSPITAL | Age: 63
End: 2023-11-08
Payer: MEDICARE

## 2023-11-08 DIAGNOSIS — E11.9 TYPE 2 DIABETES MELLITUS WITHOUT COMPLICATION, WITHOUT LONG-TERM CURRENT USE OF INSULIN (MULTI): ICD-10-CM

## 2023-11-08 NOTE — ASSESSMENT & PLAN NOTE
Patients diabetes is well controlled with most recent A1c of 5.8% (Goal < 7%).   Continue Mounjaro 5 mg weekly. Increase to 7.5 mg after 4 weeks.  In terms of coverage, he is currently past the donut hole in catastrophic coverage. He is getting conflicting information about coverage starting in the new year. Despite further inquiries it is unclear what he will have to pay in each phase as this is typically a non-formulary medication and is only covered because he previously tried trulicity, which is a formulary medication.  Compliance at present is estimated to be excellent.   Follow-up: 11/27 10am

## 2023-11-08 NOTE — PROGRESS NOTES
Pharmacist Clinic: Diabetes Management  Steven Velasquez is a 63 y.o. male was referred to Clinical Pharmacy Team for diabetes management.   Referring Provider: Giorgio Huynh MD     Subjective   HPI    HPI    - Patient's DM is controlled, but not having significant weight loss benefit with trulicity  - Submitted PA to insurance for Mounjaro and was approved for $48.31/month in catastrophic coverage  - Patient started Mounjaro 5 mg (has had one dose so far) and no increased side effects    Weight loss: currently at 258 lbs; goal is 20% weight loss    Current monitoring regimen:   Patient is using:  none    Reported blood sugars: none    Any episodes of hypoglycemia? no    Adverse Effects:   No increased side effects     Allergies   Allergen Reactions    Methocarbamol Hives    Primidone Other     Fatigue     Objective     There were no vitals taken for this visit.    Diabetes Pharmacotherapy:    Trulicity 3 mg weekly    SECONDARY PREVENTION  - Statin? Yes   - ACE-I/ARB? No  - Aspirin? No    Pertinent PMH Review:  - PMH of Pancreatitis: No  - PMH of Retinopathy: No  - PMH of Urinary Tract Infections: No  - PMH of MTC: No    Lab Review  Lab Results   Component Value Date    BILITOT 1.6 (H) 10/02/2023    CALCIUM 10.3 10/02/2023    CO2 29 10/02/2023     10/02/2023    CREATININE 1.27 10/02/2023    GLUCOSE 102 (H) 10/02/2023    ALKPHOS 61 10/02/2023    K 4.5 10/02/2023    PROT 7.6 10/02/2023     10/02/2023    AST 34 10/02/2023    ALT 48 10/02/2023    BUN 24 (H) 10/02/2023    ANIONGAP 17 10/02/2023    ALBUMIN 4.8 10/02/2023    GFRMALE 59 (A) 04/26/2023     Lab Results   Component Value Date    TRIG 101 10/02/2023    CHOL 228 (H) 10/02/2023    HDL 56.4 10/02/2023     Lab Results   Component Value Date    HGBA1C 5.8 (H) 10/02/2023    HGBA1C 5.6 06/16/2022    HGBA1C 5.7 (A) 03/17/2022     The 10-year ASCVD risk score (Zuly CAST, et al., 2019) is: 20.1%    Values used to calculate the score:      Age: 63 years       Sex: Male      Is Non- : No      Diabetic: Yes      Tobacco smoker: No      Systolic Blood Pressure: 132 mmHg      Is BP treated: No      HDL Cholesterol: 56.4 mg/dL      Total Cholesterol: 228 mg/dL    Drug Interactions:  none    Assessment/Plan   Problem List Items Addressed This Visit       Diabetes mellitus, type 2 (CMS/[facility])     Patients diabetes is well controlled with most recent A1c of 5.8% (Goal < 7%).   Continue Mounjaro 5 mg weekly. Increase to 7.5 mg after 4 weeks.  In terms of coverage, he is currently past the donut hole in catastrophic coverage. He is getting conflicting information about coverage starting in the new year. Despite further inquiries it is unclear what he will have to pay in each phase as this is typically a non-formulary medication and is only covered because he previously tried trulicity, which is a formulary medication.  Compliance at present is estimated to be excellent.   Follow-up: 11/27 10am         Relevant Medications    tirzepatide (Mounjaro) 7.5 mg/0.5 mL pen injector    Other Relevant Orders    Follow Up In Advanced Primary [facility] - Pharmacy     Maile Nelson, PharmD, [facility]  Clinical Pharmacist     Continue all meds under the continuation of care with the referring provider and clinical pharmacy team.

## 2023-11-09 RX ORDER — TIRZEPATIDE 7.5 MG/.5ML
7.5 INJECTION, SOLUTION SUBCUTANEOUS
Qty: 2 ML | Refills: 1 | Status: SHIPPED | OUTPATIENT
Start: 2023-11-09 | End: 2023-11-27 | Stop reason: ALTCHOICE

## 2023-11-27 ENCOUNTER — TELEMEDICINE (OUTPATIENT)
Dept: PHARMACY | Facility: HOSPITAL | Age: 63
End: 2023-11-27
Payer: MEDICARE

## 2023-11-27 DIAGNOSIS — E11.9 TYPE 2 DIABETES MELLITUS WITHOUT COMPLICATION, WITHOUT LONG-TERM CURRENT USE OF INSULIN (MULTI): ICD-10-CM

## 2023-11-27 RX ORDER — TIRZEPATIDE 10 MG/.5ML
10 INJECTION, SOLUTION SUBCUTANEOUS
Qty: 2 ML | Refills: 0 | Status: SHIPPED | OUTPATIENT
Start: 2023-11-27 | End: 2023-12-11 | Stop reason: ALTCHOICE

## 2023-11-27 NOTE — PROGRESS NOTES
Pharmacist Clinic: Diabetes Management  Steven Velasquez is a 63 y.o. male was referred to Clinical Pharmacy Team for diabetes management.   Referring Provider: Giorgio Huynh MD     Subjective   HPI    HPI    - Patient's DM is controlled, but not having significant weight loss benefit with trulicity  - Not having any benefit from Mounjaro yet; feels appetite is higher than it was with the trulicity  - Set to increase to 7.5 mg dose this Sunday    Weight loss: none yet; currently at 258 lbs; goal is 20% weight loss    Current monitoring regimen:   Patient is using:  none    Reported blood sugars: none    Any episodes of hypoglycemia? no    Adverse Effects:   No increased side effects     Allergies   Allergen Reactions    Methocarbamol Hives    Primidone Other     Fatigue     Objective     There were no vitals taken for this visit.    Diabetes Pharmacotherapy:    Mounjaro 5 mg weekly    SECONDARY PREVENTION  - Statin? Yes   - ACE-I/ARB? No  - Aspirin? No    Pertinent PMH Review:  - PMH of Pancreatitis: No  - PMH of Retinopathy: No  - PMH of Urinary Tract Infections: No  - PMH of MTC: No    Lab Review  Lab Results   Component Value Date    BILITOT 1.6 (H) 10/02/2023    CALCIUM 10.3 10/02/2023    CO2 29 10/02/2023     10/02/2023    CREATININE 1.27 10/02/2023    GLUCOSE 102 (H) 10/02/2023    ALKPHOS 61 10/02/2023    K 4.5 10/02/2023    PROT 7.6 10/02/2023     10/02/2023    AST 34 10/02/2023    ALT 48 10/02/2023    BUN 24 (H) 10/02/2023    ANIONGAP 17 10/02/2023    ALBUMIN 4.8 10/02/2023    GFRMALE 59 (A) 04/26/2023     Lab Results   Component Value Date    TRIG 101 10/02/2023    CHOL 228 (H) 10/02/2023    HDL 56.4 10/02/2023     Lab Results   Component Value Date    HGBA1C 5.8 (H) 10/02/2023    HGBA1C 5.6 06/16/2022    HGBA1C 5.7 (A) 03/17/2022     The 10-year ASCVD risk score (Zuly CAST, et al., 2019) is: 20.1%    Values used to calculate the score:      Age: 63 years      Sex: Male      Is Non-  : No      Diabetic: Yes      Tobacco smoker: No      Systolic Blood Pressure: 132 mmHg      Is BP treated: No      HDL Cholesterol: 56.4 mg/dL      Total Cholesterol: 228 mg/dL    Drug Interactions:  none    Assessment/Plan   Problem List Items Addressed This Visit       Diabetes mellitus, type 2 (CMS/[facility])     Patients diabetes is well controlled with most recent A1c of 5.8% (Goal < 7%).   INCREASE Mounjaro to 7.5 mg weekly.  Sending Rx for 10 mg dose for when he finishes 7.5 mg dose.  In terms of coverage, he is currently past the donut hole in catastrophic coverage. He is getting conflicting information about coverage starting in the new year. Despite further inquiries it is unclear what he will have to pay in each phase as this is typically a non-formulary medication and is only covered because he previously tried trulicity, which is a formulary medication.  Compliance at present is estimated to be excellent.   Follow-up: 12/11 10am         Relevant Medications    tirzepatide (Mounjaro) 10 mg/0.5 mL pen injector    Other Relevant Orders    Follow Up In Advanced [facility] Care - Pharmacy     Maile Nelson, PharmD, [facility]  Clinical Pharmacist     Continue all meds under the continuation of care with the referring provider and clinical pharmacy team.

## 2023-11-27 NOTE — ASSESSMENT & PLAN NOTE
Patients diabetes is well controlled with most recent A1c of 5.8% (Goal < 7%).   INCREASE Mounjaro to 7.5 mg weekly.  Sending Rx for 10 mg dose for when he finishes 7.5 mg dose.  In terms of coverage, he is currently past the donut hole in catastrophic coverage. He is getting conflicting information about coverage starting in the new year. Despite further inquiries it is unclear what he will have to pay in each phase as this is typically a non-formulary medication and is only covered because he previously tried trulicity, which is a formulary medication.  Compliance at present is estimated to be excellent.   Follow-up: 12/11 10am

## 2023-12-06 ENCOUNTER — OFFICE VISIT (OUTPATIENT)
Dept: RHEUMATOLOGY | Facility: CLINIC | Age: 63
End: 2023-12-06
Payer: MEDICARE

## 2023-12-06 VITALS
WEIGHT: 264 LBS | DIASTOLIC BLOOD PRESSURE: 79 MMHG | SYSTOLIC BLOOD PRESSURE: 123 MMHG | BODY MASS INDEX: 45.32 KG/M2 | HEART RATE: 79 BPM

## 2023-12-06 DIAGNOSIS — I73.00 RAYNAUD'S DISEASE WITHOUT GANGRENE: Primary | ICD-10-CM

## 2023-12-06 PROCEDURE — 3074F SYST BP LT 130 MM HG: CPT | Performed by: STUDENT IN AN ORGANIZED HEALTH CARE EDUCATION/TRAINING PROGRAM

## 2023-12-06 PROCEDURE — 3078F DIAST BP <80 MM HG: CPT | Performed by: STUDENT IN AN ORGANIZED HEALTH CARE EDUCATION/TRAINING PROGRAM

## 2023-12-06 PROCEDURE — 3050F LDL-C >= 130 MG/DL: CPT | Performed by: STUDENT IN AN ORGANIZED HEALTH CARE EDUCATION/TRAINING PROGRAM

## 2023-12-06 PROCEDURE — 1036F TOBACCO NON-USER: CPT | Performed by: STUDENT IN AN ORGANIZED HEALTH CARE EDUCATION/TRAINING PROGRAM

## 2023-12-06 PROCEDURE — 99215 OFFICE O/P EST HI 40 MIN: CPT | Performed by: STUDENT IN AN ORGANIZED HEALTH CARE EDUCATION/TRAINING PROGRAM

## 2023-12-06 PROCEDURE — 3044F HG A1C LEVEL LT 7.0%: CPT | Performed by: STUDENT IN AN ORGANIZED HEALTH CARE EDUCATION/TRAINING PROGRAM

## 2023-12-06 RX ORDER — TADALAFIL 20 MG/1
TABLET ORAL
Qty: 90 TABLET | Refills: 3 | Status: SHIPPED | OUTPATIENT
Start: 2023-12-06

## 2023-12-06 NOTE — PROGRESS NOTES
Subjective   Patient ID: Steven Velasquez is a 63 y.o. male who presents for psoriatic arthritis (Patient in today for a routine follow up to discuss medications and Raynaud's disease. No physical complaints or concerns.).  HPI:   white male with Parkinson's, history of lumbosacral radiculopathy status post surgery, raynadus sxs but without color changes, possible history of gout, here for gabapentin refill.  He is on a stable dose of gabapentin; 3 tabs at night and 1 tab in the morning.     Feels that current dose of gabapentin does not help; his main symptom is right great toe numbness that he has had since his back surgery around 2021             Objective   /79 (BP Location: Left arm, Patient Position: Sitting, BP Cuff Size: Large adult)   Pulse 79   Wt 120 kg (264 lb)   BMI 45.32 kg/m²       Physical Exam  Constitutional: Alert and in no acute distress. Well developed, well nourished     Lab Results   Component Value Date    WBC 10.5 10/02/2023    HGB 17.4 10/02/2023    HCT 52.9 (H) 10/02/2023     10/02/2023    ALT 48 10/02/2023    AST 34 10/02/2023    CREATININE 1.27 10/02/2023          Lab Results   Component Value Date    SEDRATE 5 04/26/2021    CRP 0.69 04/26/2021   \\            There is currently no information documented on the homunculus. Go to the Rheumatology activity and complete the homunculus joint exam.      ECG 12 lead  Agree with computer interpretation.  No change from prior tracing.          Assessment/Plan:       #Lumbosacral radiculopathy  -Mostly resolved status post surgery around 2021 but still has some chronic numbness inright greattoe  -does not feel that gabapentin works; since toe pain is worst at night; recommended he take his 4 tabs at gabapentin at night rather than 3 at night and 1 in AM (300 mg tablets)  -Patient states he will let me know when/where he wants gabapentin refills sent  -Counseled patient that he should monitor renal function with PCP while on  gabapentin     #Raynauds sxs but no color changes  -keep hands warm  -can trial tadalafil 20 mg daily; patient has tolerated this drug in the past for ED  -counseled to let me know if any lightheadedness or low BP  -1 year tadalafil sent to [facility] per patient pref 12/2023  -Not true Raynaud's, no color changes  -no ulcers  -can fu in 1-2 months to see how doing on tadalafil in heat of winter  -may not need meds during summer, discussed this    Patient counseled to seek medical care if any new or worsening symptoms, urgently if needed.      Note will be sent to primary care doctor.    Return to clinic in 1-2 mo, sooner if needed    Dragon dictation software was used to dictate this note. Errors may have occurred during dictation that was not intended by the user.

## 2023-12-06 NOTE — PATIENT INSTRUCTIONS
Try taking 4 tabs gabapentin at night- let me know if you need refills and where    Start the tadalafil 20 mg nightly for raynauds- If you have any lightheadedness with this, let me know and we can dose reduce. You may not need it during the summer

## 2023-12-11 ENCOUNTER — TELEMEDICINE (OUTPATIENT)
Dept: PHARMACY | Facility: HOSPITAL | Age: 63
End: 2023-12-11
Payer: MEDICARE

## 2023-12-11 DIAGNOSIS — E11.9 TYPE 2 DIABETES MELLITUS WITHOUT COMPLICATION, WITHOUT LONG-TERM CURRENT USE OF INSULIN (MULTI): ICD-10-CM

## 2023-12-11 RX ORDER — TIRZEPATIDE 12.5 MG/.5ML
12.5 INJECTION, SOLUTION SUBCUTANEOUS
Qty: 2 ML | Refills: 0 | Status: SHIPPED | OUTPATIENT
Start: 2023-12-11 | End: 2023-12-27 | Stop reason: ALTCHOICE

## 2023-12-11 NOTE — PROGRESS NOTES
Pharmacist Clinic: Diabetes Management  Steven Velasquez is a 63 y.o. male was referred to Clinical Pharmacy Team for diabetes management.   Referring Provider: Giorgio Huynh MD     Subjective   HPI    HPI    - Started 7.5 mg dose last Sunday; feeling kaufman longer, appetite lowered slightly, hasn't changed eating habits   - BP last night 99/59 pulse 70 9:30 pm last night; 104/67 10:50 pm; BP now is 138/80   - Patient read online about BP lowering with Mounjaro and is concerned. In addition, was started on tadalafil 20 mg daily for Raynaud's (gets numbness/pain in fingers when cold) and also read that this could lower BP. He requested I speak with Dr. Huynh about this before preceding  - Dr. Huynh not concerned with BP, thinks he is fine to start tadalafil and continue with Mounjaro    Weight loss: none yet; currently at 258 lbs; goal is 20% weight loss    Current monitoring regimen:   Patient is using:  none    Reported blood sugars: none    Any episodes of hypoglycemia? no    Adverse Effects:   No increased side effects     Allergies   Allergen Reactions    Methocarbamol Hives    Primidone Other     Fatigue     Objective     There were no vitals taken for this visit.    Diabetes Pharmacotherapy:    Mounjaro 7.5 mg weekly    SECONDARY PREVENTION  - Statin? Yes   - ACE-I/ARB? No  - Aspirin? No    Pertinent PMH Review:  - PMH of Pancreatitis: No  - PMH of Retinopathy: No  - PMH of Urinary Tract Infections: No  - PMH of MTC: No    Lab Review  Lab Results   Component Value Date    BILITOT 1.6 (H) 10/02/2023    CALCIUM 10.3 10/02/2023    CO2 29 10/02/2023     10/02/2023    CREATININE 1.27 10/02/2023    GLUCOSE 102 (H) 10/02/2023    ALKPHOS 61 10/02/2023    K 4.5 10/02/2023    PROT 7.6 10/02/2023     10/02/2023    AST 34 10/02/2023    ALT 48 10/02/2023    BUN 24 (H) 10/02/2023    ANIONGAP 17 10/02/2023    ALBUMIN 4.8 10/02/2023    GFRMALE 59 (A) 04/26/2023     Lab Results   Component Value Date    TRIG  101 10/02/2023    CHOL 228 (H) 10/02/2023    HDL 56.4 10/02/2023     Lab Results   Component Value Date    HGBA1C 5.8 (H) 10/02/2023    HGBA1C 5.6 06/16/2022    HGBA1C 5.7 (A) 03/17/2022     The 10-year ASCVD risk score (Zuly CAST, et al., 2019) is: 17.9%    Values used to calculate the score:      Age: 63 years      Sex: Male      Is Non- : No      Diabetic: Yes      Tobacco smoker: No      Systolic Blood Pressure: 123 mmHg      Is BP treated: No      HDL Cholesterol: 56.4 mg/dL      Total Cholesterol: 228 mg/dL    Drug Interactions:  none    Assessment/Plan   Problem List Items Addressed This Visit       Diabetes mellitus, type 2 (CMS/[facility])     Patients diabetes is well controlled with most recent A1c of 5.8% (Goal < 7%).   CONTINUE Mounjaro 7.5 mg weekly. Plan to increase to 10 mg weekly once finished with this dose (approx start date 12/31).   Sending Rx for 12.5 mg dose. Trying to fill ahead of schedule as patient is unsure of coverage come January.  In terms of coverage, he is currently past the donut hole in catastrophic coverage. He is getting conflicting information about coverage starting in the new year. Despite further inquiries it is unclear what he will have to pay in each phase as this is typically a non-formulary medication and is only covered because he previously tried trulicity, which is a formulary medication.  Compliance at present is estimated to be excellent.   Follow-up: 12/27 12pm         Relevant Medications    tirzepatide (Mounjaro) 12.5 mg/0.5 mL pen injector    Other Relevant Orders    Follow Up In Advanced Primary Care - Pharmacy     Maile Nelson, PharmD, [facility]  Clinical Pharmacist     Continue all meds under the continuation of care with the referring provider and clinical pharmacy team.

## 2023-12-12 DIAGNOSIS — G47.33 OBSTRUCTIVE SLEEP APNEA: Primary | ICD-10-CM

## 2023-12-17 DIAGNOSIS — I73.00 RAYNAUD'S DISEASE WITHOUT GANGRENE: Primary | ICD-10-CM

## 2023-12-17 RX ORDER — TADALAFIL 5 MG/1
5 TABLET ORAL DAILY
Qty: 90 TABLET | Refills: 3 | Status: SHIPPED | OUTPATIENT
Start: 2023-12-17 | End: 2024-01-16

## 2023-12-27 ENCOUNTER — TELEMEDICINE (OUTPATIENT)
Dept: PHARMACY | Facility: HOSPITAL | Age: 63
End: 2023-12-27
Payer: MEDICARE

## 2023-12-27 DIAGNOSIS — E11.9 TYPE 2 DIABETES MELLITUS WITHOUT COMPLICATION, WITHOUT LONG-TERM CURRENT USE OF INSULIN (MULTI): ICD-10-CM

## 2023-12-27 RX ORDER — TIRZEPATIDE 15 MG/.5ML
15 INJECTION, SOLUTION SUBCUTANEOUS
Qty: 6 ML | Refills: 3 | Status: SHIPPED | OUTPATIENT
Start: 2023-12-27

## 2023-12-27 NOTE — PROGRESS NOTES
Pharmacist Clinic: Diabetes Management  Steven Velasquez is a 63 y.o. male was referred to Clinical Pharmacy Team for diabetes management.   Referring Provider: Giorgio Huynh MD     Subjective   HPI    HPI    - Effectiveness increased a little bit in terms of appetite suppression, but still having sugar cravings  - Will start 10 mg this weekend    Weight loss: none yet; currently at 258 lbs; goal is 20% weight loss    Current monitoring regimen:   Patient is using:  none    Reported blood sugars: none    Any episodes of hypoglycemia? no    Adverse Effects:   No increased side effects     Allergies   Allergen Reactions    Methocarbamol Hives    Primidone Other     Fatigue     Objective     There were no vitals taken for this visit.    Diabetes Pharmacotherapy:    Mounjaro 7.5 mg weekly    SECONDARY PREVENTION  - Statin? Yes   - ACE-I/ARB? No  - Aspirin? No    Pertinent PMH Review:  - PMH of Pancreatitis: No  - PMH of Retinopathy: No  - PMH of Urinary Tract Infections: No  - PMH of MTC: No    Lab Review  Lab Results   Component Value Date    BILITOT 1.6 (H) 10/02/2023    CALCIUM 10.3 10/02/2023    CO2 29 10/02/2023     10/02/2023    CREATININE 1.27 10/02/2023    GLUCOSE 102 (H) 10/02/2023    ALKPHOS 61 10/02/2023    K 4.5 10/02/2023    PROT 7.6 10/02/2023     10/02/2023    AST 34 10/02/2023    ALT 48 10/02/2023    BUN 24 (H) 10/02/2023    ANIONGAP 17 10/02/2023    ALBUMIN 4.8 10/02/2023    GFRMALE 59 (A) 04/26/2023     Lab Results   Component Value Date    TRIG 101 10/02/2023    CHOL 228 (H) 10/02/2023    HDL 56.4 10/02/2023     Lab Results   Component Value Date    HGBA1C 5.8 (H) 10/02/2023    HGBA1C 5.6 06/16/2022    HGBA1C 5.7 (A) 03/17/2022     The 10-year ASCVD risk score (Zuly CAST, et al., 2019) is: 17.9%    Values used to calculate the score:      Age: 63 years      Sex: Male      Is Non- : No      Diabetic: Yes      Tobacco smoker: No      Systolic Blood Pressure: 123  mmHg      Is BP treated: No      HDL Cholesterol: 56.4 mg/dL      Total Cholesterol: 228 mg/dL    Drug Interactions:  none    Assessment/Plan   Problem List Items Addressed This Visit       Diabetes mellitus, type 2 (CMS/[facility])     Patients diabetes is well controlled with most recent A1c of 5.8% (Goal < 7%).   INCREASE Mounjaro to 10 mg weekly.  Sending Rx for 15 mg dose. Trying to fill ahead of schedule as patient is unsure of coverage come January.  In terms of coverage, he is currently past the donut hole in catastrophic coverage. He is getting conflicting information about coverage starting in the new year. Despite further inquiries it is unclear what he will have to pay in each phase as this is typically a non-formulary medication and is only covered because he previously tried trulicity, which is a formulary medication.  Compliance at present is estimated to be excellent.   Follow-up: 1/24 12pm         Relevant Medications    tirzepatide (Mounjaro) 15 mg/0.5 mL pen injector    Other Relevant Orders    Follow Up In Advanced Primary Care - Pharmacy     Maile Nelson, PharmD, [facility]  Clinical Pharmacist     Continue all meds under the continuation of care with the referring provider and clinical pharmacy team.

## 2023-12-27 NOTE — ASSESSMENT & PLAN NOTE
Patients diabetes is well controlled with most recent A1c of 5.8% (Goal < 7%).   INCREASE Mounjaro to 10 mg weekly.  Sending Rx for 15 mg dose. Trying to fill ahead of schedule as patient is unsure of coverage come January.  In terms of coverage, he is currently past the donut hole in catastrophic coverage. He is getting conflicting information about coverage starting in the new year. Despite further inquiries it is unclear what he will have to pay in each phase as this is typically a non-formulary medication and is only covered because he previously tried trulicity, which is a formulary medication.  Compliance at present is estimated to be excellent.   Follow-up: 1/24 12pm

## 2024-01-15 DIAGNOSIS — M54.16 LUMBAR RADICULOPATHY, CHRONIC: Primary | ICD-10-CM

## 2024-01-15 RX ORDER — GABAPENTIN 300 MG/1
CAPSULE ORAL
Qty: 360 CAPSULE | Refills: 1 | Status: SHIPPED | OUTPATIENT
Start: 2024-01-15 | End: 2024-01-31 | Stop reason: SDUPTHER

## 2024-01-24 ENCOUNTER — TELEMEDICINE (OUTPATIENT)
Dept: PHARMACY | Facility: HOSPITAL | Age: 64
End: 2024-01-24
Payer: MEDICARE

## 2024-01-24 DIAGNOSIS — E11.9 TYPE 2 DIABETES MELLITUS WITHOUT COMPLICATION, WITHOUT LONG-TERM CURRENT USE OF INSULIN (MULTI): ICD-10-CM

## 2024-01-24 NOTE — ASSESSMENT & PLAN NOTE
Patients diabetes is well controlled with most recent A1c of 5.8% (Goal < 7%).   INCREASE Mounjaro to 12.5 mg weekly.  Patient has 1 month of 12.5 mg and 3 months of 15 mg. Will check coverage/cost of Mounjaro when he's due again.  Compliance at present is estimated to be excellent.   Follow-up: 2/21 12pm

## 2024-01-24 NOTE — PROGRESS NOTES
Pharmacist Clinic: Diabetes Management  Steven Velasquez is a 63 y.o. male was referred to Clinical Pharmacy Team for diabetes management.   Referring Provider: Giorgio Huynh MD     Subjective   HPI    HPI    - Effectiveness improved with 10 mg dose; still having cravings, maybe slightly better  - Has had all 4 doses of 10 mg; will start 12.5 mg dose this Sunday  - Has 1 month of 12.5 mg and 3 months of 15 mg at home; will plan to check coverage/price of mounjaro when it's due to be filled again    Weight loss: has noticed a couple of pounds lost; currently at 258 lbs; goal is 20% weight loss    Current monitoring regimen:   Patient is using:  none    Reported blood sugars: none    Any episodes of hypoglycemia? no    Adverse Effects:   No increased side effects     Allergies   Allergen Reactions    Methocarbamol Hives    Primidone Other     Fatigue     Objective     There were no vitals taken for this visit.    Diabetes Pharmacotherapy:    Mounjaro 10 mg weekly Sundays    SECONDARY PREVENTION  - Statin? Yes   - ACE-I/ARB? No  - Aspirin? No    Pertinent PMH Review:  - PMH of Pancreatitis: No  - PMH of Retinopathy: No  - PMH of Urinary Tract Infections: No  - PMH of MTC: No    Lab Review  Lab Results   Component Value Date    BILITOT 1.6 (H) 10/02/2023    CALCIUM 10.3 10/02/2023    CO2 29 10/02/2023     10/02/2023    CREATININE 1.27 10/02/2023    GLUCOSE 102 (H) 10/02/2023    ALKPHOS 61 10/02/2023    K 4.5 10/02/2023    PROT 7.6 10/02/2023     10/02/2023    AST 34 10/02/2023    ALT 48 10/02/2023    BUN 24 (H) 10/02/2023    ANIONGAP 17 10/02/2023    ALBUMIN 4.8 10/02/2023    GFRMALE 59 (A) 04/26/2023     Lab Results   Component Value Date    TRIG 101 10/02/2023    CHOL 228 (H) 10/02/2023    HDL 56.4 10/02/2023     Lab Results   Component Value Date    HGBA1C 5.8 (H) 10/02/2023    HGBA1C 5.6 06/16/2022    HGBA1C 5.7 (A) 03/17/2022     The 10-year ASCVD risk score (Zuly CAST, et al., 2019) is: 17.9%     Values used to calculate the score:      Age: 63 years      Sex: Male      Is Non- : No      Diabetic: Yes      Tobacco smoker: No      Systolic Blood Pressure: 123 mmHg      Is BP treated: No      HDL Cholesterol: 56.4 mg/dL      Total Cholesterol: 228 mg/dL    Drug Interactions:  none    Assessment/Plan   Problem List Items Addressed This Visit       Diabetes mellitus, type 2 (CMS/[facility])     Patients diabetes is well controlled with most recent A1c of 5.8% (Goal < 7%).   INCREASE Mounjaro to 12.5 mg weekly.  Patient has 1 month of 12.5 mg and 3 months of 15 mg. Will check coverage/cost of Mounjaro when he's due again.  Compliance at present is estimated to be excellent.   Follow-up: 2/21 12pm         Relevant Orders    Follow Up In Advanced Primary Care - Pharmacy     Maile Parra, PharmD, [facility]  Clinical Pharmacist     Continue all meds under the continuation of care with the referring provider and clinical pharmacy team.

## 2024-01-31 ENCOUNTER — TELEMEDICINE (OUTPATIENT)
Dept: RHEUMATOLOGY | Facility: CLINIC | Age: 64
End: 2024-01-31
Payer: MEDICARE

## 2024-01-31 DIAGNOSIS — M54.16 LUMBAR RADICULOPATHY, CHRONIC: ICD-10-CM

## 2024-01-31 PROCEDURE — 99214 OFFICE O/P EST MOD 30 MIN: CPT | Performed by: STUDENT IN AN ORGANIZED HEALTH CARE EDUCATION/TRAINING PROGRAM

## 2024-01-31 RX ORDER — GABAPENTIN 300 MG/1
CAPSULE ORAL
Qty: 360 CAPSULE | Refills: 3 | Status: SHIPPED | OUTPATIENT
Start: 2024-01-31

## 2024-01-31 NOTE — PROGRESS NOTES
Subjective   Patient ID: Steven Velasquez is a 63 y.o. male who presents for No chief complaint on file..  HPI:   white male with Parkinson's, history of lumbosacral radiculopathy status post surgery, raynadus sxs but without color changes, possible history of gout, here for gabapentin refill.  He is on a stable dose of gabapentin; 3 tabs at night and 1 tab in the morning.     Feels that current dose of gabapentin does not help; his main symptom is right great toe numbness that he has had since his back surgery around 2021             Objective   There were no vitals taken for this visit.      Physical Exam  Constitutional: Alert and in no acute distress. Well developed, well nourished     Lab Results   Component Value Date    WBC 10.5 10/02/2023    HGB 17.4 10/02/2023    HCT 52.9 (H) 10/02/2023     10/02/2023    ALT 48 10/02/2023    AST 34 10/02/2023    CREATININE 1.27 10/02/2023          Lab Results   Component Value Date    SEDRATE 5 04/26/2021    CRP 0.69 04/26/2021   \\            There is currently no information documented on the homunculus. Go to the Rheumatology activity and complete the homunculus joint exam.      ECG 12 lead  Agree with computer interpretation.  No change from prior tracing.          Assessment/Plan:       #Lumbosacral radiculopathy  -Mostly resolved status post surgery around 2021 but still has some chronic numbness inright greattoe  Gabapentin helps somewhat; since toe pain is worst at night; recommended he take his 4 tabs at gabapentin at night rather than 3 at night and 1 in AM (300 mg tablets)- 1 yr refill 1/2024   -right great toe numbness is chronic since 2021; spine surgeon Dr Tavera is aware, it is not getting worse so I do not think an EMG is needed at this time  -Patient states he will let me know when/where he wants gabapentin refills sent  -Counseled patient that he should monitor renal function with PCP while on gabapentin     #Raynauds sxs but no color  changes  -keep hands warm  -tadalafil daily was too much for patient; he keeps his hands warm with heated gloves and is doing well on this  - will let me know if he needs meds      Patient counseled to seek medical care if any new or worsening symptoms, urgently if needed.      Note will be sent to primary care doctor.    Return to clinic 6-12 , sooner if needed    Dragon dictation software was used to dictate this note. Errors may have occurred during dictation that was not intended by the user.

## 2024-02-21 ENCOUNTER — TELEMEDICINE (OUTPATIENT)
Dept: PHARMACY | Facility: HOSPITAL | Age: 64
End: 2024-02-21
Payer: MEDICARE

## 2024-02-21 DIAGNOSIS — E11.9 TYPE 2 DIABETES MELLITUS WITHOUT COMPLICATION, WITHOUT LONG-TERM CURRENT USE OF INSULIN (MULTI): ICD-10-CM

## 2024-02-21 NOTE — ASSESSMENT & PLAN NOTE
"Patients diabetes is well controlled with most recent A1c of 5.8% (Goal < 7%).   INCREASE Mounjaro to 15 mg weekly.  Patient has 3 months supply of 15 mg. Will check coverage/cost of Mounjaro when he's due again.  We discussed at length today reasons he might not be seeing the weight loss with Mounjaro yet. He describes that he feels full, but his brain is telling him he's hungry and he wants to eat, so he is overpowering the full feeling in his stomach and eating anyway. I explained that in order to weight loss to occur, he needs to be in a caloric deficit. So if he has not changed his eating habits, it makes sense that he hasn't lost very much weight yet. He really needs to work on his \"willpower\" in making better choices of what he's eating and really trying to watch his portion sizes. I also explained that in the studies for Mounjaro, all of those patients were also enrolled in a diet/exercise program. To maximize weight loss, he will need to make changes to his diet moving forward.  Compliance at present is estimated to be excellent.   Follow-up: 3/20 12pm  "

## 2024-02-21 NOTE — PROGRESS NOTES
Pharmacist Clinic: Diabetes Management  Steven Velasquez is a 63 y.o. male was referred to Clinical Pharmacy Team for diabetes management.   Referring Provider: Giorgio Huynh MD     Subjective   HPI    HPI    - Has had all 4 doses of 12.5 mg dose  - Still not losing weight or feeling benefit of Mounjaro, but also hasn't made many changes to his diet    Diet:  - Still having cravings, but getting kaufman quicker/staying kaufman longer  - Not eating less or better than previously    Exercise:  - Goes to gym 4 times per week    Weight loss: has noticed a couple of pounds lost; currently at 258 lbs; goal is 20% weight loss    Current monitoring regimen:   Patient is using:  none    Reported blood sugars: none    Any episodes of hypoglycemia? no    Adverse Effects:   No increased side effects     Allergies   Allergen Reactions    Methocarbamol Hives    Primidone Other     Fatigue     Objective     There were no vitals taken for this visit.    Diabetes Pharmacotherapy:    Mounjaro 12.5 mg weekly Sundays    SECONDARY PREVENTION  - Statin? Yes   - ACE-I/ARB? No  - Aspirin? No    Pertinent PMH Review:  - PMH of Pancreatitis: No  - PMH of Retinopathy: No  - PMH of Urinary Tract Infections: No  - PMH of MTC: No    Lab Review  Lab Results   Component Value Date    BILITOT 1.6 (H) 10/02/2023    CALCIUM 10.3 10/02/2023    CO2 29 10/02/2023     10/02/2023    CREATININE 1.27 10/02/2023    GLUCOSE 102 (H) 10/02/2023    ALKPHOS 61 10/02/2023    K 4.5 10/02/2023    PROT 7.6 10/02/2023     10/02/2023    AST 34 10/02/2023    ALT 48 10/02/2023    BUN 24 (H) 10/02/2023    ANIONGAP 17 10/02/2023    ALBUMIN 4.8 10/02/2023    GFRMALE 59 (A) 04/26/2023     Lab Results   Component Value Date    TRIG 101 10/02/2023    CHOL 228 (H) 10/02/2023    HDL 56.4 10/02/2023     Lab Results   Component Value Date    HGBA1C 5.8 (H) 10/02/2023    HGBA1C 5.6 06/16/2022    HGBA1C 5.7 (A) 03/17/2022     The 10-year ASCVD risk score (Zuly CAST, et  "al., 2019) is: 17.9%    Values used to calculate the score:      Age: 63 years      Sex: Male      Is Non- : No      Diabetic: Yes      Tobacco smoker: No      Systolic Blood Pressure: 123 mmHg      Is BP treated: No      HDL Cholesterol: 56.4 mg/dL      Total Cholesterol: 228 mg/dL    Drug Interactions:  none    Assessment/Plan   Problem List Items Addressed This Visit       Diabetes mellitus, type 2 (CMS/[facility])     Patients diabetes is well controlled with most recent A1c of 5.8% (Goal < 7%).   INCREASE Mounjaro to 15 mg weekly.  Patient has 3 months supply of 15 mg. Will check coverage/cost of Mounjaro when he's due again.  We discussed at length today reasons he might not be seeing the weight loss with Mounjaro yet. He describes that he feels full, but his brain is telling him he's hungry and he wants to eat, so he is overpowering the full feeling in his stomach and eating anyway. I explained that in order to weight loss to occur, he needs to be in a caloric deficit. So if he has not changed his eating habits, it makes sense that he hasn't lost very much weight yet. He really needs to work on his \"willpower\" in making better choices of what he's eating and really trying to watch his portion sizes. I also explained that in the studies for Mounjaro, all of those patients were also enrolled in a diet/exercise program. To maximize weight loss, he will need to make changes to his diet moving forward.  Compliance at present is estimated to be excellent.   Follow-up: 3/20 12pm         Relevant Orders    Follow Up In Advanced Primary Care - Pharmacy     Maile Parra PharmD, [facility]  Clinical Pharmacist     Continue all meds under the continuation of care with the referring provider and clinical pharmacy team.  "

## 2024-02-26 ENCOUNTER — OFFICE VISIT (OUTPATIENT)
Dept: PRIMARY CARE | Facility: CLINIC | Age: 64
End: 2024-02-26
Payer: MEDICARE

## 2024-02-26 VITALS
DIASTOLIC BLOOD PRESSURE: 85 MMHG | SYSTOLIC BLOOD PRESSURE: 144 MMHG | HEART RATE: 82 BPM | WEIGHT: 260 LBS | TEMPERATURE: 98.2 F | BODY MASS INDEX: 44.63 KG/M2

## 2024-02-26 DIAGNOSIS — G20.A1 PARKINSON'S DISEASE WITHOUT DYSKINESIA OR FLUCTUATING MANIFESTATIONS (MULTI): Primary | ICD-10-CM

## 2024-02-26 DIAGNOSIS — E66.01 MORBID OBESITY (MULTI): ICD-10-CM

## 2024-02-26 DIAGNOSIS — E11.9 TYPE 2 DIABETES MELLITUS WITHOUT COMPLICATION, WITHOUT LONG-TERM CURRENT USE OF INSULIN (MULTI): ICD-10-CM

## 2024-02-26 LAB — POC HEMOGLOBIN A1C: 6.2 % (ref 4.2–6.5)

## 2024-02-26 PROCEDURE — 99213 OFFICE O/P EST LOW 20 MIN: CPT | Performed by: INTERNAL MEDICINE

## 2024-02-26 PROCEDURE — 3077F SYST BP >= 140 MM HG: CPT | Performed by: INTERNAL MEDICINE

## 2024-02-26 PROCEDURE — 1036F TOBACCO NON-USER: CPT | Performed by: INTERNAL MEDICINE

## 2024-02-26 PROCEDURE — 3079F DIAST BP 80-89 MM HG: CPT | Performed by: INTERNAL MEDICINE

## 2024-02-26 PROCEDURE — 83036 HEMOGLOBIN GLYCOSYLATED A1C: CPT | Performed by: INTERNAL MEDICINE

## 2024-02-26 PROCEDURE — G2211 COMPLEX E/M VISIT ADD ON: HCPCS | Performed by: INTERNAL MEDICINE

## 2024-02-26 RX ORDER — TRIHEXYPHENIDYL HYDROCHLORIDE 2 MG/1
1 TABLET ORAL 3 TIMES DAILY
Qty: 135 TABLET | Refills: 3 | Status: SHIPPED | OUTPATIENT
Start: 2024-02-26 | End: 2025-02-25

## 2024-02-26 ASSESSMENT — PAIN SCALES - GENERAL: PAINLEVEL: 0-NO PAIN

## 2024-02-26 NOTE — PROGRESS NOTES
Subjective   Patient ID: Steven Velasquez is a 63 y.o. male who presents for Follow-up.  Steven is in for follow up of DM. He has switched sleep therapy providers. He does not know which one he currently uses but will get back to me.      Current Outpatient Medications   Medication Instructions    amantadine (Symmetrel) 100 mg tablet 1 tablet, oral, 3 times daily    apple cider vinegar 500 mg, oral, 3 times daily    betamethasone dipropionate 0.05 % cream 1 Application    celecoxib (CELEBREX) 200 mg, oral, Daily PRN    cholecalciferol (Vitamin D-3) 5,000 Units tablet 1 tablet, oral, Daily    chromium picolinate 200 mcg tablet tablet 1 tablet, oral, 3 times daily    ciclopirox 1 % shampoo 1 Application, Topical, Every other day, TO FACE AND SCALP    clindamycin (Cleocin T) 1 % lotion 1 Application    coenzyme Q-10 100 mg, oral, Daily    cyclobenzaprine (FLEXERIL) 5 mg, oral, 2 times daily PRN    gabapentin (Neurontin) 300 mg capsule Take 4 tabs nightly    hydrocortisone 2.5 % cream 1 Application    ketoconazole (NIZOral) 2 % shampoo 5 mL, Topical, TO FOREHEAD 2-3 TIMES A WEEK    melatonin 15 mg, oral, Nightly PRN    metroNIDAZOLE (Metrogel) 1 % gel 1 Application    Mounjaro 15 mg, subcutaneous, Every 7 days    multivit-iron-minerals-folic acid (Centrum Silver) 0.4 mg-300 mcg- 250 mcg tab 1 tablet, oral, Daily    Myrbetriq 50 mg, oral, Daily    nicotinamide riboside chloride (NICOTINAMIDE RIBOSIDE, BULK, MISC) 300 mg, oral, Daily    polyethylene glycol (GLYCOLAX, MIRALAX) 17 g, Daily RT    pramipexole (Mirapex) 0.125 mg tablet 1 tablet, oral, 3 times daily    pramipexole (Mirapex) 0.5 mg tablet 1 tablet, oral, 3 times daily    pravastatin (PRAVACHOL) 40 mg, oral, Daily    saw palmetto 1,350 mg, oral, 3 times daily    sulfacetamide suspension (Klaron) 10 % suspension topical 1 Application    tadalafil (Cialis) 20 mg tablet One tab nightly for raynauds    tadalafil (CIALIS) 5 mg, oral, Daily PRN    tadalafil (CIALIS) 5  mg, oral, Daily    triamcinolone (Kenalog) 0.1 % cream Topical, 2 times daily, to elbows and fingertips    trihexyphenidyl (ARTANE) 1 mg, oral, 3 times daily    zolpidem (AMBIEN) 10 mg, oral, Nightly PRN     Review of Systems  All other systems are reviewed and are without complaint.    Objective   /85 (BP Location: Left arm, Patient Position: Sitting, BP Cuff Size: Adult)   Pulse 82   Temp 36.8 °C (98.2 °F) (Oral)   Wt 118 kg (260 lb)   BMI 44.63 kg/m²   Physical Exam      Assessment/Plan   Problem List Items Addressed This Visit             ICD-10-CM    Diabetes mellitus, type 2 (CMS/HCC) E11.9     Mounjaro has not been a rousing success. His A1C is not as well controlled. He has not lost weight. I will give the maximum dose of Mounjaro 3 more months as he has just started this, but I have a mind to switch back to Trulicity which has had lower A1C levels.         Relevant Orders    POCT glycosylated hemoglobin (Hb A1C) manually resulted (Completed)    Morbid obesity (CMS/HCC) E66.01    Parkinson's disease - Primary G20.A1    Relevant Medications    trihexyphenidyl (Artane) 2 mg tablet        >50% of today's visit was counseling and coordination of care.

## 2024-03-20 ENCOUNTER — TELEMEDICINE (OUTPATIENT)
Dept: PHARMACY | Facility: HOSPITAL | Age: 64
End: 2024-03-20
Payer: MEDICARE

## 2024-03-20 DIAGNOSIS — E11.9 TYPE 2 DIABETES MELLITUS WITHOUT COMPLICATION, WITHOUT LONG-TERM CURRENT USE OF INSULIN (MULTI): ICD-10-CM

## 2024-03-20 NOTE — ASSESSMENT & PLAN NOTE
"Patients diabetes is well controlled with most recent A1c of 5.8% (Goal < 7%).   CONTINUE Mounjaro 15 mg weekly.  Patient has 3 months supply of 15 mg. Will check coverage/cost of Mounjaro when he's due again.  We discussed at length today reasons he might not be seeing the weight loss with Mounjaro yet. He describes that he feels full, but his brain is telling him he's hungry and he wants to eat, so he is overpowering the full feeling in his stomach and eating anyway. I explained that in order to weight loss to occur, he needs to be in a caloric deficit. So if he has not changed his eating habits, it makes sense that he hasn't lost very much weight yet. He really needs to work on his \"willpower\" in making better choices of what he's eating and really trying to watch his portion sizes. I also explained that in the studies for Mounjaro, all of those patients were also enrolled in a diet/exercise program. To maximize weight loss, he will need to make changes to his diet moving forward.  Compliance at present is estimated to be excellent.   Follow-up: 4/17 12pm  "

## 2024-03-20 NOTE — PROGRESS NOTES
Pharmacist Clinic: Diabetes Management  Steven Velasquez is a 64 y.o. male was referred to Clinical Pharmacy Team for diabetes management.   Referring Provider: Giorgio Huynh MD     Subjective   HPI    HPI    - Still not losing weight or feeling benefit of Mounjaro, but also hasn't made many changes to his diet    Diet:  - Still having cravings, but getting kaufman quicker/staying kaufman longer  - Not eating less or better than previously  - Doesn't feel like he's over eating; he finishes all his meals though    Exercise:  - Goes to gym 4 times per week    Weight loss: has noticed a couple of pounds lost; currently at 258 lbs; goal is 20% weight loss  - Down maybe 5 lbs    Current monitoring regimen:   Patient is using:  none    Reported blood sugars: none    Any episodes of hypoglycemia? no    Adverse Effects:   No increased side effects     Allergies   Allergen Reactions    Methocarbamol Hives    Primidone Other     Fatigue     Objective     There were no vitals taken for this visit.    Diabetes Pharmacotherapy:    Mounjaro 15 mg weekly Sundays    SECONDARY PREVENTION  - Statin? Yes   - ACE-I/ARB? No  - Aspirin? No    Pertinent PMH Review:  - PMH of Pancreatitis: No  - PMH of Retinopathy: No  - PMH of Urinary Tract Infections: No  - PMH of MTC: No    Lab Review  Lab Results   Component Value Date    BILITOT 1.6 (H) 10/02/2023    CALCIUM 10.3 10/02/2023    CO2 29 10/02/2023     10/02/2023    CREATININE 1.27 10/02/2023    GLUCOSE 102 (H) 10/02/2023    ALKPHOS 61 10/02/2023    K 4.5 10/02/2023    PROT 7.6 10/02/2023     10/02/2023    AST 34 10/02/2023    ALT 48 10/02/2023    BUN 24 (H) 10/02/2023    ANIONGAP 17 10/02/2023    ALBUMIN 4.8 10/02/2023    GFRMALE 59 (A) 04/26/2023     Lab Results   Component Value Date    TRIG 101 10/02/2023    CHOL 228 (H) 10/02/2023    HDL 56.4 10/02/2023     Lab Results   Component Value Date    HGBA1C 6.2 02/26/2024    HGBA1C 5.8 (H) 10/02/2023    HGBA1C 5.6 06/16/2022  "    The 10-year ASCVD risk score (Zuly CAST, et al., 2019) is: 24.7%    Values used to calculate the score:      Age: 64 years      Sex: Male      Is Non- : No      Diabetic: Yes      Tobacco smoker: No      Systolic Blood Pressure: 144 mmHg      Is BP treated: No      HDL Cholesterol: 56.4 mg/dL      Total Cholesterol: 228 mg/dL    Drug Interactions:  none    Assessment/Plan   Problem List Items Addressed This Visit       Diabetes mellitus, type 2 (CMS/[facility])     Patients diabetes is well controlled with most recent A1c of 5.8% (Goal < 7%).   CONTINUE Mounjaro 15 mg weekly.  Patient has 3 months supply of 15 mg. Will check coverage/cost of Mounjaro when he's due again.  We discussed at length today reasons he might not be seeing the weight loss with Mounjaro yet. He describes that he feels full, but his brain is telling him he's hungry and he wants to eat, so he is overpowering the full feeling in his stomach and eating anyway. I explained that in order to weight loss to occur, he needs to be in a caloric deficit. So if he has not changed his eating habits, it makes sense that he hasn't lost very much weight yet. He really needs to work on his \"willpower\" in making better choices of what he's eating and really trying to watch his portion sizes. I also explained that in the studies for Mounjaro, all of those patients were also enrolled in a diet/exercise program. To maximize weight loss, he will need to make changes to his diet moving forward.  Compliance at present is estimated to be excellent.   Follow-up: 4/17 12pm         Relevant Orders    Follow Up In Advanced Primary Care - Pharmacy     Maile Parra, PharmD, [facility]  Clinical Pharmacist     Continue all meds under the continuation of care with the referring provider and clinical pharmacy team.  "

## 2024-03-20 NOTE — Clinical Note
Michele Huynh! As I'm sure you saw at your last visit with Steven, he hasn't really lost any weight since switching from Trulicity to Mounjaro. Unfortunately, he says that Mounjaro hasn't reduced his appetite at all and even if it has, he hasn't changed his eating habits at all. He still eats the same as he did before and admits to overeating sometimes. He mostly seems unwilling to make these changes on his own. I have tried to emphasize the importance of lifestyle changes to maximize the benefits of the medication so I'm hoping it starts to get better. Just wanted to update you on our conversations. Thanks!

## 2024-04-17 ENCOUNTER — APPOINTMENT (OUTPATIENT)
Dept: PHARMACY | Facility: HOSPITAL | Age: 64
End: 2024-04-17
Payer: MEDICARE

## 2024-04-18 ENCOUNTER — TELEMEDICINE (OUTPATIENT)
Dept: PHARMACY | Facility: HOSPITAL | Age: 64
End: 2024-04-18
Payer: MEDICARE

## 2024-04-18 DIAGNOSIS — E11.9 TYPE 2 DIABETES MELLITUS WITHOUT COMPLICATION, WITHOUT LONG-TERM CURRENT USE OF INSULIN (MULTI): ICD-10-CM

## 2024-04-18 NOTE — ASSESSMENT & PLAN NOTE
Patients diabetes is well controlled with most recent A1c of 5.8% (Goal < 7%).   CONTINUE Mounjaro 15 mg weekly.  Patient has 1 month left of current supply. Will be filling again shortly  Finally starting to see benefits and has lost about 10 lbs. Encouraged him to continue working on diet  Compliance at present is estimated to be excellent.   Follow-up: 5/29 12pm (after next PCP appt with Dr. Huynh 5/28)

## 2024-04-18 NOTE — PROGRESS NOTES
Pharmacist Clinic: Diabetes Management  Steven Velasquez is a 64 y.o. male was referred to Clinical Pharmacy Team for diabetes management.   Referring Provider: Giorgio Huynh MD     Subjective   HPI    HPI    - Finally starting to notice some benefits from the Mounjaro with weight loss!    Diet:  - Appetite is reducing further than before  - Doesn't feel like he's over eating; he finishes all his meals though    Exercise:  - Goes to gym 4 times per week    Weight loss: about 10 lbs  - Currently at 250-252 lbs; goal is 20% weight loss    Current monitoring regimen:   Patient is using:  none    Reported blood sugars: none    Any episodes of hypoglycemia? no    Adverse Effects:   No increased side effects     Allergies   Allergen Reactions    Methocarbamol Hives    Primidone Other     Fatigue     Objective     There were no vitals taken for this visit.    Diabetes Pharmacotherapy:    Mounjaro 15 mg weekly Sundays    SECONDARY PREVENTION  - Statin? Yes   - ACE-I/ARB? No  - Aspirin? No    Pertinent PMH Review:  - PMH of Pancreatitis: No  - PMH of Retinopathy: No  - PMH of Urinary Tract Infections: No  - PMH of MTC: No    Lab Review  Lab Results   Component Value Date    BILITOT 1.6 (H) 10/02/2023    CALCIUM 10.3 10/02/2023    CO2 29 10/02/2023     10/02/2023    CREATININE 1.27 10/02/2023    GLUCOSE 102 (H) 10/02/2023    ALKPHOS 61 10/02/2023    K 4.5 10/02/2023    PROT 7.6 10/02/2023     10/02/2023    AST 34 10/02/2023    ALT 48 10/02/2023    BUN 24 (H) 10/02/2023    ANIONGAP 17 10/02/2023    ALBUMIN 4.8 10/02/2023    GFRMALE 59 (A) 04/26/2023     Lab Results   Component Value Date    TRIG 101 10/02/2023    CHOL 228 (H) 10/02/2023    HDL 56.4 10/02/2023     Lab Results   Component Value Date    HGBA1C 6.2 02/26/2024    HGBA1C 5.8 (H) 10/02/2023    HGBA1C 5.6 06/16/2022     The 10-year ASCVD risk score (Zuly CAST, et al., 2019) is: 24.7%    Values used to calculate the score:      Age: 64 years      Sex:  Male      Is Non- : No      Diabetic: Yes      Tobacco smoker: No      Systolic Blood Pressure: 144 mmHg      Is BP treated: No      HDL Cholesterol: 56.4 mg/dL      Total Cholesterol: 228 mg/dL    Drug Interactions:  none    Assessment/Plan   Problem List Items Addressed This Visit       Diabetes mellitus, type 2 (Multi)     Patients diabetes is well controlled with most recent A1c of 5.8% (Goal < 7%).   CONTINUE Mounjaro 15 mg weekly.  Patient has 1 month left of current supply. Will be filling again shortly  Finally starting to see benefits and has lost about 10 lbs. Encouraged him to continue working on diet  Compliance at present is estimated to be excellent.   Follow-up: 5/29 12pm (after next PCP appt with Dr. Huynh 5/28)         Relevant Orders    Follow Up In Advanced Primary Care - Pharmacy     Maile Parra, PharmD, [facility]  Clinical Pharmacist     Continue all meds under the continuation of care with the referring provider and clinical pharmacy team.

## 2024-04-26 ENCOUNTER — APPOINTMENT (OUTPATIENT)
Dept: SLEEP MEDICINE | Facility: CLINIC | Age: 64
End: 2024-04-26
Payer: MEDICARE

## 2024-04-29 PROBLEM — R00.2 PALPITATIONS: Status: ACTIVE | Noted: 2024-04-29

## 2024-05-03 ENCOUNTER — OFFICE VISIT (OUTPATIENT)
Dept: SLEEP MEDICINE | Facility: CLINIC | Age: 64
End: 2024-05-03
Payer: MEDICARE

## 2024-05-03 VITALS
HEIGHT: 64 IN | OXYGEN SATURATION: 97 % | BODY MASS INDEX: 43.43 KG/M2 | SYSTOLIC BLOOD PRESSURE: 129 MMHG | DIASTOLIC BLOOD PRESSURE: 84 MMHG | HEART RATE: 76 BPM | WEIGHT: 254.4 LBS

## 2024-05-03 DIAGNOSIS — G47.33 OBSTRUCTIVE SLEEP APNEA: Primary | ICD-10-CM

## 2024-05-03 DIAGNOSIS — G47.01 INSOMNIA DUE TO MEDICAL CONDITION: ICD-10-CM

## 2024-05-03 PROCEDURE — 3079F DIAST BP 80-89 MM HG: CPT | Performed by: NURSE PRACTITIONER

## 2024-05-03 PROCEDURE — 99214 OFFICE O/P EST MOD 30 MIN: CPT | Performed by: NURSE PRACTITIONER

## 2024-05-03 PROCEDURE — 3074F SYST BP LT 130 MM HG: CPT | Performed by: NURSE PRACTITIONER

## 2024-05-03 PROCEDURE — 1036F TOBACCO NON-USER: CPT | Performed by: NURSE PRACTITIONER

## 2024-05-03 ASSESSMENT — SLEEP AND FATIGUE QUESTIONNAIRES
SLEEP_PROBLEM_NOTICEABLE_TO_OTHERS: NOT AT ALL NOTICEABLE
HOW LIKELY ARE YOU TO NOD OFF OR FALL ASLEEP WHILE SITTING AND READING: HIGH CHANCE OF DOZING
SITING INACTIVE IN A PUBLIC PLACE LIKE A CLASS ROOM OR A MOVIE THEATER: SLIGHT CHANCE OF DOZING
HOW LIKELY ARE YOU TO NOD OFF OR FALL ASLEEP WHEN YOU ARE A PASSENGER IN A CAR FOR AN HOUR WITHOUT A BREAK: WOULD NEVER DOZE
ESS-CHAD TOTAL SCORE: 7
SATISFACTION_WITH_CURRENT_SLEEP_PATTERN: DISSATISFIED
HOW LIKELY ARE YOU TO NOD OFF OR FALL ASLEEP WHILE SITTING QUIETLY AFTER LUNCH WITHOUT ALCOHOL: WOULD NEVER DOZE
HOW LIKELY ARE YOU TO NOD OFF OR FALL ASLEEP IN A CAR, WHILE STOPPED FOR A FEW MINUTES IN TRAFFIC: WOULD NEVER DOZE
WORRIED_DISTRESSED_DUE_TO_SLEEP: NOT AT ALL NOTICEABLE
HOW LIKELY ARE YOU TO NOD OFF OR FALL ASLEEP WHILE SITTING AND TALKING TO SOMEONE: WOULD NEVER DOZE
SLEEP_PROBLEM_INTERFERES_DAILY_ACTIVITIES: NOT AT ALL NOTICEABLE
DIFFICULTY_FALLING_ASLEEP: MILD
WAKING_TOO_EARLY: MILD
HOW LIKELY ARE YOU TO NOD OFF OR FALL ASLEEP WHILE LYING DOWN TO REST IN THE AFTERNOON WHEN CIRCUMSTANCES PERMIT: MODERATE CHANCE OF DOZING
HOW LIKELY ARE YOU TO NOD OFF OR FALL ASLEEP WHILE WATCHING TV: SLIGHT CHANCE OF DOZING
DIFFICULTY_STAYING_ASLEEP: MODERATE

## 2024-05-03 ASSESSMENT — ENCOUNTER SYMPTOMS
DEPRESSION: 0
OCCASIONAL FEELINGS OF UNSTEADINESS: 0
LOSS OF SENSATION IN FEET: 0

## 2024-05-03 ASSESSMENT — PATIENT HEALTH QUESTIONNAIRE - PHQ9
SUM OF ALL RESPONSES TO PHQ9 QUESTIONS 1 AND 2: 0
1. LITTLE INTEREST OR PLEASURE IN DOING THINGS: NOT AT ALL
2. FEELING DOWN, DEPRESSED OR HOPELESS: NOT AT ALL

## 2024-05-03 ASSESSMENT — COLUMBIA-SUICIDE SEVERITY RATING SCALE - C-SSRS
1. IN THE PAST MONTH, HAVE YOU WISHED YOU WERE DEAD OR WISHED YOU COULD GO TO SLEEP AND NOT WAKE UP?: NO
6. HAVE YOU EVER DONE ANYTHING, STARTED TO DO ANYTHING, OR PREPARED TO DO ANYTHING TO END YOUR LIFE?: NO
2. HAVE YOU ACTUALLY HAD ANY THOUGHTS OF KILLING YOURSELF?: NO

## 2024-05-03 NOTE — ASSESSMENT & PLAN NOTE
11/11/2019 at  Sleep lab showing severe EDUARDO with an AHI of 111.9 and SpO2 angela of 88%. CPAP was titrated from 5 to 11. At 10 cwp, the AHI was 0.9 and SpO2 angela was 90%. Recommendation was for auto CPAP at 5-15 with P10 NP  Well controlled on 6-10 EPR of 3 via STS  Supply order updated today

## 2024-05-03 NOTE — PROGRESS NOTES
Patient: Olegario Velasquez    [id number]  : 1960 -- AGE 64 y.o.    Provider: ROHIT Pereira     Location [facility]   Service Date: 5/3/2024              [facility]  Followup Visit Note      HISTORY OF PRESENT ILLNESS       HISTORY OF PRESENT ILLNESS   Olegario Velasquez is a 64 y.o. male with past medical history of anaphylaxis, DM2, BPH, ED, hypercholesterolemia, insomnia, lumbar radiculopathy, obesity, EDUARDO, parkinson's, plantar fasciitis, prediabetes who presents to a [facility] for followup. OLEGARIO is retired.     PAST SLEEP HISTORY  OLEGARIO has had a sleep study completed on 2019 at  Sleep lab showing severe EDUARDO with an AHI of 111.9 and SpO2 angela of 88%. CPAP was titrated from 5 to 11. At 10 cwp, the AHI was 0.9 and SpO2 angela was 90%. Recommendation was for auto CPAP at 5-15 with P10 NP.     CURRENT SLEEP HISTORY    On today's visit, the patient reports tolerating PAP well. Notes the mask he is using is much more comfortable. He is getting supplies through [facility] now. No issues with their service.   Denies RLS or RBD symptoms  Notes he is trying to get more sleep but he often gets a second wind later in the evening and often stays up  Watches TV prior to bed    RLS Followup:  denies    Insomnia follow up:  Bedtime: 3 am  Sleep Latency: 20 min  Awakenings:   Wake time: 10-11 am  Naps:   sometimes 5 pm    ESS: 7   CABRERA: 7  FOSQ: 40    REVIEW OF SYSTEMS     REVIEW OF SYSTEMS  Review of Systems   All other systems reviewed and are negative.        ALLERGIES AND MEDICATIONS     ALLERGIES  Allergies   Allergen Reactions    Methocarbamol Hives    Methylisothiazolinone Unknown    Primidone Other     Fatigue       MEDICATIONS  Current Outpatient Medications   Medication Sig Dispense Refill    amantadine (Symmetrel) 100 mg tablet Take 1 tablet (100 mg) by mouth 3 times a day.      apple  cider vinegar 500 mg tablet Take 500 mg by mouth 3 times a day.      betamethasone dipropionate 0.05 % cream 1 Application.      celecoxib (CeleBREX) 200 mg capsule Take 1 capsule (200 mg) by mouth once daily as needed for mild pain (1 - 3). 90 capsule 3    cholecalciferol (Vitamin D-3) 5,000 Units tablet Take 1 tablet (5,000 Units) by mouth once daily.      chromium picolinate 200 mcg tablet tablet Take 1 tablet (200 mcg) by mouth 3 times a day.      clindamycin (Cleocin T) 1 % lotion 1 Application.      co-enzyme Q-10 100 mg capsule Take 1 capsule (100 mg) by mouth once daily. 30 capsule 11    cyclobenzaprine (Flexeril) 5 mg tablet Take 1 tablet (5 mg) by mouth 2 times a day as needed.      gabapentin (Neurontin) 300 mg capsule Take 4 tabs nightly 360 capsule 3    hydrocortisone 2.5 % cream 1 Application.      ketoconazole (NIZOral) 2 % shampoo Apply 0.5 Applications topically. TO FOREHEAD 2-3 TIMES A WEEK      melatonin 5 mg tablet Take 3 tablets (15 mg) by mouth as needed at bedtime for sleep.      metroNIDAZOLE (Metrogel) 1 % gel 1 Application.      multivit-iron-minerals-folic acid (Centrum Silver) 0.4 mg-300 mcg- 250 mcg tab Take 1 tablet by mouth once daily.      Myrbetriq 50 mg tablet extended release 24 hr 24 hr tablet Take 1 tablet (50 mg) by mouth once daily.      nicotinamide riboside chloride (NICOTINAMIDE RIBOSIDE, BULK, MISC) Take 300 mg by mouth once daily.      polyethylene glycol (Glycolax) 17 gram/dose powder 17 g once daily.      pramipexole (Mirapex) 0.125 mg tablet Take 1 tablet (0.125 mg) by mouth 3 times a day.      pramipexole (Mirapex) 0.5 mg tablet Take 1 tablet (0.5 mg) by mouth 3 times a day.      pravastatin (Pravachol) 40 mg tablet Take 1 tablet (40 mg) by mouth once daily. 90 tablet 3    saw palmetto 450 mg capsule Take 3 capsules (1,350 mg) by mouth 3 times a day.      tadalafil (Cialis) 20 mg tablet One tab nightly for raynauds 90 tablet 3    tadalafil (Cialis) 5 mg tablet Take 1  tablet (5 mg) by mouth once daily as needed for erectile dysfunction.      tirzepatide (Mounjaro) 15 mg/0.5 mL pen injector Inject 15 mg under the skin every 7 days. 6 mL 3    triamcinolone (Kenalog) 0.1 % cream Apply topically 2 times a day. to elbows and fingertips      trihexyphenidyl (Artane) 2 mg tablet Take 0.5 tablets (1 mg) by mouth 3 times a day. 135 tablet 3    zolpidem (Ambien) 10 mg tablet Take 1 tablet (10 mg) by mouth as needed at bedtime for sleep.      tadalafil (Cialis) 5 mg tablet Take 1 tablet (5 mg) by mouth once daily. 90 tablet 3     Current Facility-Administered Medications   Medication Dose Route Frequency Provider Last Rate Last Admin    coenzyme Q-10 capsule 100 mg  100 mg oral Once Giorgio Huynh MD           PPAST MEDICAL HISTORY  Past Medical History:   Diagnosis Date    Anaphylactic shock, unspecified, subsequent encounter 05/10/2018    Anaphylaxis, subsequent encounter    Elevated blood-pressure reading, without diagnosis of hypertension 06/24/2020    Elevated blood pressure reading without diagnosis of hypertension    Personal history of diseases of the blood and blood-forming organs and certain disorders involving the immune mechanism     History of polycythemia    Personal history of other specified conditions 11/13/2015    History of epigastric pain       PAST SURGICAL HISTORY:  Past Surgical History:   Procedure Laterality Date    OTHER SURGICAL HISTORY  09/19/2016    Tenotomy Lateral Epicondyle Elbow Open With Tendon Reattachment    OTHER SURGICAL HISTORY  09/19/2016    Palatopharyngoplasty    OTHER SURGICAL HISTORY  10/06/2021    Transforaminal lumbar interbody fusion    OTHER SURGICAL HISTORY  10/06/2021    Lumbar laminectomy       FAMILY HISTORY  Family History   Problem Relation Name Age of Onset    Osteoarthritis Mother      Osteoporosis Mother      Lung cancer Father      Parkinsonism Mother's Sister         FAMILY HISTORY: No changes since previous visit. Otherwise  "non-contributory as charted.       SOCIAL HISTORY  He  reports that he quit smoking about 44 years ago. His smoking use included cigarettes. He has never used smokeless tobacco. No history on file for alcohol use and drug use.       PHYSICAL EXAM     VITAL SIGNS: /84 (BP Location: Left arm, Patient Position: Sitting, BP Cuff Size: Adult long)   Pulse 76   Ht 1.626 m (5' 4\")   Wt 115 kg (254 lb 6.4 oz)   SpO2 97%   BMI 43.67 kg/m²      PREVIOUS WEIGHTS:  Wt Readings from Last 3 Encounters:   05/03/24 115 kg (254 lb 6.4 oz)   02/26/24 118 kg (260 lb)   12/06/23 120 kg (264 lb)       Physical Exam  Physical Exam   Constitutional: Alert and oriented, cooperative, no obvious distress   HEENT: Non icteric or anemic, EOM WNL bilaterally   Neck: Supple, no JVD, no goiter, no adenopathy, no rigidity  Chest: CTA bilaterally, no wheezing, crackles, rubs   Cardiac: RRR, S1 and S2, no murmur, rub, thrill   Abdomen: Obese, Soft, nontender, no masses, no organomegaly   Extremities: No clubbing, no LL edema   Neuromuscular: Cranial nerves grossly intact, no focal deficits      RESULTS/DATA     Bicarbonate (mmol/L)   Date Value   10/02/2023 29   04/26/2023 31   10/04/2022 30   06/16/2022 28       PAP Adherence:      Airsense 10- 11/29/2019    Melissa nasal- 7/14/2021  Supplies via [facility]        ASSESSMENT/PLAN     Mr. Velasquez is a 64 y.o. male and He returns in followup to the [facility] for EDUARDO.    Problem List and Orders  Problem List Items Addressed This Visit             ICD-10-CM    Insomnia due to medical condition G47.01     Has PRN medications which are helpful  Discussed strategies to optimize sleep time and phase. He verbalized understanding         Obstructive sleep apnea - Primary G47.33     11/11/2019 at  Sleep lab showing severe EDUARDO with an AHI of 111.9 and SpO2 angela of 88%. CPAP was titrated from 5 to 11. At 10 cwp, the AHI was 0.9 and SpO2 angela was 90%. " Recommendation was for auto CPAP at 5-15 with P10 NP  Well controlled on 6-10 EPR of 3 via STS  Supply order updated today         Relevant Orders    Positive Airway Pressure (PAP) Therapy       Disposition    Return to clinic in 3-6 months

## 2024-05-03 NOTE — PATIENT INSTRUCTIONS
[facility] Sleep Medicine  [facility] [address]  [address]  [place] [facility] [place] [address]       NAME: Steven Velasquez   DATE:  05/03/24    DIAGNOSIS:   1. Obstructive sleep apnea  Positive Airway Pressure (PAP) Therapy          Thank you for coming to the Sleep Medicine Clinic today! Your sleep medicine provider today was: ROHIT Pereira Below is a summary of your treatment plan, other important information, and our contact numbers:    TREATMENT PLAN:   - Follow-up in 12 months.  - If not already done, sign up for 'My Chart' and send prescription requests or messages through this      Annual Reminders About Your Sleep Apnea    Your sleep apnea is well controlled based on reviewing your PAP Data Report.     General Reminders:  Continue current machine settings. Continue using machine every night. Need at least 4 hours daily usage.   Remember to clean your mask, tubings, and water chamber regularly as instructed.  Know the replacement schedule of your supplies/ accessories and contact your DME (durable medical equipment) provider if you are due for them.  Avoid driving or operating heavy machinery when drowsy. A person driving while sleepy is 5 times more likely to have an accident. If you feel sleepy, pull over and take a short power nap (sleep for less than 30 minutes). Otherwise, ask somebody to drive you.    Follow-up sooner through [facility] [employer] or calling our office if you have any of the following symptoms:  Snoring or stopping breathing while using the machine  Recurrence of fatigue, sleepiness, insomnia, and other symptoms you had prior using machine  Persistent or worsening fatigue or sleepiness despite regular use of machine  Issues tolerating the machine like bloating sensation, air hunger, too much hot air, too much pressure, taking off mask without recall in the middle of sleep, etc.     Other conservative measures to improve sleep  apnea:  Losing weight can lead to some improvement of EDUARDO which means you will need lower pressures in machine to control your EDUARDO. In some patients, they don't need to use the machine after bariatric surgery. Hence, consider medical and/or surgical weight loss especially if your BMI is more than 35.  Avoiding alcohol, sedative-hypnotics, and sedating medications is imperative as these substances can worsen snoring and sleep apnea  If you have nasal congestion or seasonal allergies, improving your breathing through the nose is critical for treating sleep apnea, tolerating CPAP, and improving your sleep; hence, using intranasal steroid spray like Flonase might help. Make sure you know the proper way to use it.  Stay off your back when sleeping.    Common issues with PAP machine:  Mask gets dislodged when turning to the side: Consider getting a CPAP pillow or switching to a mask with hose on top.     Dry mouth:  Your machine has built-in humidifier that heats up the air to prevent dry mouth. It can be adjusted to your comfort. You can try that first and increase setting one level one night at a time to check which setting is comfortable and effective in lessening dry mouth. If dry mouth persists despite humidity setting adjustment, may apply OTC Biotene gel over the gums at bedtime.  If Biotene gel is not effective, consider trying XEROSTOM gel from [employer].  Also, eliminate or reduce dose of meds that can cause dry mouth if possible. Lastly, may try getting a separate room humidifier machine.    Airleaks: Please call DME as they may need to adjust your mask or refit you with a different kind of mask. In addition, you can ask DME for tips on getting a good mask seal and mask fit.     Difficulty tolerating the mask: Contact your DME to try a different kind of mask and/or call office to get a referral to Sleep Psychologist for CPAP desensitization. CPAP desensitization technique is a set of strategies that helps  "patient cope with claustrophobia and anxiety related to wearing mask. Alternatively, we can do a daytime mini-sleep study called PAP-nap trial wherein you will try on different kinds of mask and the sleep technician will try different pressure settings on CPAP and BPAP machines to see which specific pressure is tolerable and comfortable for you.     Water droplets or moisture within the hose and/or mask: This is called rain-out and it is caused by condensation of too much heated humidity on the cooler walls of the hose. If you have rain-out, turn down humidity settings or get a heated hose. If you already have a heated hose, turn up the \"tube temperature\" of the heated hose. Alternatively, if you don't want to get a heated hose or warmer air, may wrap the CPAP hose with stockings to keep it somewhat warm. Also, you need to place the machine on the floor and lower the hose so that water won't travel upward towards your mask.     You can also go to the following EDUCATION WEBSITES for further information:   American Academy of Sleep Medicine http://sleepeducation.org  National Sleep Foundation: https://sleepfoundation.org  American Sleep Apnea Association: https://www.sleepapnea.org (for patients with sleep apnea)    Here at [facility], we wish you a restful sleep!     Instructions - Common EDUARDO Recs: - For your sleep apnea, continue to use your PAP every night and use it whenever you are sleeping.   - Avoid alcohol or sedatives several hours prior to sleeping.   - Get additional supplies for your PAP (e.g., mask, hose, filters) every 3 months or as your insurance allows from your [employer] company. Replacement cushions for your PAP mask can be requested monthly if airseals are an issue.  - Remember to clean your mask, tubings, and water chamber regularly as instructed.  - Avoid driving or operating heavy machinery when drowsy. A person driving while sleepy is five (5) times more likely to have an accident. If you " feel sleepy, pull over and take a short power nap (sleep for less than 30 minutes). Otherwise, ask somebody to drive you.    EASY WAYS TO IMPROVE YOUR SLEEP:  1. Go to bed and wake up at the same time every day.   Aim for 8 hours but some people need less, some need more.   Get out of bed if you are not sleeping.   Limit naps to 20 min or less.   2. Expose yourself to daylight and/ or bright light in the morning.   Go outside or spend time near a window each morning.   You can use a light box (found on Amazon) if you wake before the sunrise.   Limit light exposure in the evenings (including electronic usage).   Try meditation, reading, stretching, deep breathing, warm shower or bath, or yoga nidra as part of your bedtime routine. There are many great FREE, videos or audio tracks on [employer]/ [employer], etc for guidance.  3. Exercise, in some form, EVERY day, but not too close to bedtime. Consider making this part of your routine at the start of your day, followed by a cool shower.  4. Eat meals at roughly the same time every day. Make sure you are prioritizing fruits, vegetables, whole grains, lean proteins.  5. Time your caffeine intake. Make sure you are not drinking caffeine within 8 to 12 hours prior to your bedtime.   6. Avoid marijuana, alcohol, and nicotine. They will reduce sleep quality in any quantity.  7. Learn to manage anxiety. Psychology services at  can be reached at [phone] to schedule an appointment.     IMPORTANT INFORMATION:     Call [phone] for medical emergencies.  Our offices are generally open from Monday-Friday, 9 am - 5 pm.  If you need to get in touch with me, you may either call me and my team(number is below) or you can use [employer].  If a referral for a test, for CPAP, or for another specialist was made, and you have not heard about scheduling this within a week, please call scheduling at [phone] ([phone]).  If you are unable to make your appointment for clinic or an overnight study,  kindly call the office at least 48 hours in advance to cancel and reschedule.  If you are on CPAP, please bring your device's card to each clinic appointment unless told otherwise by your provider.  There are no supporting services by either the sleep doctors or their staff on weekends and Holidays, or after 5 PM on weekdays.   If you have been asked to come to a sleep study, make sure you bring toiletries, a comfy pillow, and any nighttime medications that you may regularly take. Also be sure to eat dinner before you arrive. We generally do not provide meals.      PRESCRIPTIONS:  We require 7 days advanced notice for prescription refills. If we do not receive the request in this time, we cannot guarantee that your medication will be refilled in time. Please contact the sleep nurses listed below for refills or request via [employer].     IMPORTANT PHONE NUMBERS:   Sleep Medicine Clinic Fax: [phone]  Appointments (for Pediatric Sleep Clinic): [phone] - option 1  Appointments (for Adult Sleep Clinic): [phone] - option 2  Appointments (For Sleep Studies): [phone] - option 3  Behavioral Sleep Medicine: [phone]  Sleep Surgery: [phone]  ENT (Otolaryngology): [phone]  Headache Clinic (Neurology): [phone]  Neurology: [phone]  Psychiatry: [phone]  Pulmonary Function Testing (PFT) Center: [phone]  Pulmonary Medicine: [phone]  [facility] (DME): [phone]  [employer] (DME): [phone]  [facility] ([facility]): [phone]    Our Adult Sleep Medicine Team (Please do not hesitate to call the office or sleep nurse with any questions between appointments):    Adult Sleep Nurse (Crystal Ramirez RN):  For clinical questions and refilling prescriptions: [phone]  Email sleep diaries and other documents at: [email or web address]    Adult Sleep Medicine Secretaries:  Ángela Mckeon (For  Sabas/Abdirizak/Sven/Nikunj/Shayy/Todd):   P: [phone]  F: [phone]  Khadra Dennis (For Cain/Dieudonneler): P: [phone]  Fax: [phone]  Roberta Milligan (For Kingavic/Blank): P: [phone]  F: [phone]  Eda Stafford (For Noris): P: [phone]  F: [phone]  Bonnie Johnson (For Andria/Kirt/Zakhary): P: [phone]  F: [phone]  Nora Ascencio (For Sukhjinder/Donaldo): P: [phone]  F: [phone]     Adult Sleep Medicine Advanced Practice Providers:  Javier Hurst ([place], [place])  Daniela Moralez ([facility])  Ghada Davis CNP ([place], [place], [place])  Mandi Machuca CNP ([place], [place], [place])  Melinda Hamm ([facility], [place], [facility])  Shelby Fulton CNP ([facility])      Our Sleep Testing Center (STC) Locations:  Our team will contact you to schedule your sleep study, however, you can contact us as follow:  Main Phone Line (scheduling only): [phone]), option 3  Adult and Pediatric Locations  [facility] (6 years and older): [facility] - 4th floor ([address] [place] [address]) After hours line: [phone]  [facility] at [facility] (Main campus: All ages): [facility], 6th floor. After hours line: [phone]   [place] (5 years and older; younger considered on case-by-case basis): [address]; [address], [address]. Scheduling  After hours line: [phone]   [place] (6 years and older): [address]; [address]; [address]   [place] (6 years and older): [address]  After hours line: [phone]   [place] (13 years and older) in [place]: [address], 2nd floor  After hours line: [phone]   [place] (13 year and older): [address]  After hours line: [phone] (Home studies out of [facility])    Adult Only Locations:   [place] (18 years and older): [address] [place] [address], [facility]  "floor   [place] (18 years and older): [address]; 4th floor  After hours line: [phone]   [place] (18 years and older) at [place]: [address] [facility]  After hours line: [phone]        CONTACTING YOUR SLEEP MEDICINE PROVIDER:  Send a message directly to your provider through \"My Chart\", which is the email service through your  Records Account: [email or web address]   Call [phone] and leave a message. One of the administrative assistants will forward the message to your sleep medicine provider through \"My Chart\" and/or email.     Your sleep medicine provider for this visit was: RAFI Pereira-CNP    In the event that you are running more than 15 minutes late to your appointment, I will kindly ask you to reschedule.       "

## 2024-05-03 NOTE — ASSESSMENT & PLAN NOTE
Has PRN medications which are helpful  Discussed strategies to optimize sleep time and phase. He verbalized understanding

## 2024-05-28 ENCOUNTER — OFFICE VISIT (OUTPATIENT)
Dept: PRIMARY CARE | Facility: CLINIC | Age: 64
End: 2024-05-28
Payer: MEDICARE

## 2024-05-28 VITALS
SYSTOLIC BLOOD PRESSURE: 124 MMHG | TEMPERATURE: 98.3 F | WEIGHT: 251.8 LBS | HEART RATE: 80 BPM | DIASTOLIC BLOOD PRESSURE: 76 MMHG | BODY MASS INDEX: 43.22 KG/M2

## 2024-05-28 DIAGNOSIS — E11.9 TYPE 2 DIABETES MELLITUS WITHOUT COMPLICATION, WITHOUT LONG-TERM CURRENT USE OF INSULIN (MULTI): ICD-10-CM

## 2024-05-28 DIAGNOSIS — E66.01 MORBID OBESITY (MULTI): Primary | ICD-10-CM

## 2024-05-28 LAB — POC HEMOGLOBIN A1C: 5.7 % (ref 4.2–6.5)

## 2024-05-28 PROCEDURE — 3078F DIAST BP <80 MM HG: CPT | Performed by: INTERNAL MEDICINE

## 2024-05-28 PROCEDURE — 3074F SYST BP LT 130 MM HG: CPT | Performed by: INTERNAL MEDICINE

## 2024-05-28 PROCEDURE — 99214 OFFICE O/P EST MOD 30 MIN: CPT | Performed by: INTERNAL MEDICINE

## 2024-05-28 PROCEDURE — 83036 HEMOGLOBIN GLYCOSYLATED A1C: CPT | Performed by: INTERNAL MEDICINE

## 2024-05-28 PROCEDURE — 1036F TOBACCO NON-USER: CPT | Performed by: INTERNAL MEDICINE

## 2024-05-28 ASSESSMENT — PAIN SCALES - GENERAL: PAINLEVEL: 0-NO PAIN

## 2024-05-28 NOTE — ASSESSMENT & PLAN NOTE
Blood sugar is better controlled.  Hemoglobin A1c equals 5.7%.  This is a combination of medication and diet.

## 2024-05-28 NOTE — PROGRESS NOTES
Subjective   Patient ID: Steven Velasquez is a 64 y.o. male who presents for Follow-up.  Steven is in for follow-up for elevated blood sugars, Parkinson's disease, and morbid obesity.  He has noted some changes in his skin after each injection of Mounjaro.  He has also noticed changes in his skin of both lower extremities from mid shin down to the ankle.  His dermatologist told him that he thought it was eczema.    He has achieved some weight loss using 15 mg of Mounjaro each week.  The drug is well-tolerated.    He also wonders about memory loss.  He is research has given him some drugs which can be associated with loss of memory.  He is particularly concerned about gabapentin which she has been taking for peripheral neuropathy symptoms.  Also noted in his medical regimen and on the list of medications which may affect memory are pravastatin and Ambien.      Current Outpatient Medications   Medication Instructions    amantadine (Symmetrel) 100 mg tablet 1 tablet, oral, 3 times daily    apple cider vinegar 500 mg, oral, 3 times daily    betamethasone dipropionate 0.05 % cream 1 Application    celecoxib (CELEBREX) 200 mg, oral, Daily PRN    cholecalciferol (Vitamin D-3) 5,000 Units tablet 1 tablet, oral, Daily    chromium picolinate 200 mcg tablet tablet 1 tablet, oral, 3 times daily    clindamycin (Cleocin T) 1 % lotion 1 Application    coenzyme Q-10 100 mg, oral, Daily    cyclobenzaprine (FLEXERIL) 5 mg, oral, 2 times daily PRN    gabapentin (Neurontin) 300 mg capsule Take 4 tabs nightly    hydrocortisone 2.5 % cream 1 Application    ketoconazole (NIZOral) 2 % shampoo 5 mL, Topical, TO FOREHEAD 2-3 TIMES A WEEK    melatonin 15 mg, oral, Nightly PRN    metroNIDAZOLE (Metrogel) 1 % gel 1 Application    Mounjaro 15 mg, subcutaneous, Every 7 days    multivit-iron-minerals-folic acid (Centrum Silver) 0.4 mg-300 mcg- 250 mcg tab 1 tablet, oral, Daily    Myrbetriq 50 mg, oral, Daily    nicotinamide riboside chloride  (NICOTINAMIDE RIBOSIDE, BULK, MISC) 300 mg, oral, Daily    polyethylene glycol (GLYCOLAX, MIRALAX) 17 g, Daily RT    pramipexole (Mirapex) 0.125 mg tablet 1 tablet, oral, 3 times daily    pramipexole (Mirapex) 0.5 mg tablet 1 tablet, oral, 3 times daily    pravastatin (PRAVACHOL) 40 mg, oral, Daily    saw palmetto 1,350 mg, oral, 3 times daily    tadalafil (Cialis) 20 mg tablet One tab nightly for raynauds    tadalafil (CIALIS) 5 mg, oral, Daily PRN    tadalafil (CIALIS) 5 mg, oral, Daily    triamcinolone (Kenalog) 0.1 % cream Topical, 2 times daily, to elbows and fingertips    trihexyphenidyl (ARTANE) 1 mg, oral, 3 times daily    zolpidem (AMBIEN) 10 mg, oral, Nightly PRN     Review of Systems    Objective   /76 (BP Location: Left arm, Patient Position: Sitting, BP Cuff Size: Adult)   Pulse 80   Temp 36.8 °C (98.3 °F) (Oral)   Wt 114 kg (251 lb 12.8 oz)   BMI 43.22 kg/m²   Physical Exam  His legs show very early changes of venous stasis dermatitis with hemosiderin deposition in the medial shins bilaterally.  The area around yesterday's Mounjaro injection has a macule which is erythematous and approximately 3 cm in largest dimension.  It is not circular.  This may certainly represent a minor injection reaction.    Assessment/Plan   Problem List Items Addressed This Visit             ICD-10-CM    Diabetes mellitus, type 2 (Multi) E11.9     Blood sugar is better controlled.  Hemoglobin A1c equals 5.7%.  This is a combination of medication and diet.         Relevant Orders    POCT glycosylated hemoglobin (Hb A1C) manually resulted (Completed)    Morbid obesity (Multi) - Primary E66.01     We have seen 10 pounds of weight loss in this most recent quarter.  Mounjaro should be continued.

## 2024-05-29 ENCOUNTER — TELEMEDICINE (OUTPATIENT)
Dept: PHARMACY | Facility: HOSPITAL | Age: 64
End: 2024-05-29
Payer: MEDICARE

## 2024-05-29 DIAGNOSIS — E11.9 TYPE 2 DIABETES MELLITUS WITHOUT COMPLICATION, WITHOUT LONG-TERM CURRENT USE OF INSULIN (MULTI): ICD-10-CM

## 2024-05-29 NOTE — PROGRESS NOTES
Pharmacist Clinic: Diabetes Management  Steven Velasquez is a 64 y.o. male was referred to Clinical Pharmacy Team for diabetes management.   Referring Provider: Giorgio Huynh MD     Subjective   HPI    HPI    - Finally starting to notice some benefits from the Mounjaro with weight loss!    Diet:  - Appetite is reducing further than before  - Doesn't feel like he's over eating; he finishes all his meals though    Exercise:  - Goes to gym 4 times per week    Weight loss: about 10-15 lbs  - Currently at 251 lbs; goal is 20% weight loss    Current monitoring regimen:   Patient is using:  none    Reported blood sugars: none    Any episodes of hypoglycemia? no    Adverse Effects:   No increased side effects     Allergies   Allergen Reactions    Methocarbamol Hives    Methylisothiazolinone Unknown    Primidone Other     Fatigue     Objective     There were no vitals taken for this visit.    Diabetes Pharmacotherapy:    Mounjaro 15 mg weekly Sundays    SECONDARY PREVENTION  - Statin? Yes   - ACE-I/ARB? No  - Aspirin? No    Pertinent PMH Review:  - PMH of Pancreatitis: No  - PMH of Retinopathy: No  - PMH of Urinary Tract Infections: No  - PMH of MTC: No    Lab Review  Lab Results   Component Value Date    BILITOT 1.6 (H) 10/02/2023    CALCIUM 10.3 10/02/2023    CO2 29 10/02/2023     10/02/2023    CREATININE 1.27 10/02/2023    GLUCOSE 102 (H) 10/02/2023    ALKPHOS 61 10/02/2023    K 4.5 10/02/2023    PROT 7.6 10/02/2023     10/02/2023    AST 34 10/02/2023    ALT 48 10/02/2023    BUN 24 (H) 10/02/2023    ANIONGAP 17 10/02/2023    ALBUMIN 4.8 10/02/2023    GFRMALE 59 (A) 04/26/2023     Lab Results   Component Value Date    TRIG 101 10/02/2023    CHOL 228 (H) 10/02/2023    HDL 56.4 10/02/2023     Lab Results   Component Value Date    HGBA1C 5.7 05/28/2024    HGBA1C 6.2 02/26/2024    HGBA1C 5.8 (H) 10/02/2023     The 10-year ASCVD risk score (Zuly CAST, et al., 2019) is: 19.5%    Values used to calculate the  score:      Age: 64 years      Sex: Male      Is Non- : No      Diabetic: Yes      Tobacco smoker: No      Systolic Blood Pressure: 124 mmHg      Is BP treated: No      HDL Cholesterol: 56.4 mg/dL      Total Cholesterol: 228 mg/dL    Drug Interactions:  none    Assessment/Plan   Problem List Items Addressed This Visit       Diabetes mellitus, type 2 (Multi)    Relevant Orders    Follow Up In [facility] - Pharmacy     Patients diabetes is well controlled with most recent A1c of 5.7% (Goal < 7%).   CONTINUE Mounjaro 15 mg weekly.  Finally starting to see benefits and has lost about 10-15 lbs. Encouraged him to continue working on diet  Compliance at present is estimated to be excellent.   Follow-up: 7/17 12pm    Maile Parra PharmD, [facility]  Clinical Pharmacist     Continue all meds under the continuation of care with the referring provider and clinical pharmacy team.

## 2024-06-12 DIAGNOSIS — E78.00 HYPERCHOLESTEROLEMIA: ICD-10-CM

## 2024-06-12 RX ORDER — PRAVASTATIN SODIUM 40 MG/1
40 TABLET ORAL DAILY
Qty: 90 TABLET | Refills: 3 | Status: SHIPPED | OUTPATIENT
Start: 2024-06-12

## 2024-07-17 ENCOUNTER — APPOINTMENT (OUTPATIENT)
Dept: PHARMACY | Facility: HOSPITAL | Age: 64
End: 2024-07-17
Payer: MEDICARE

## 2024-07-17 DIAGNOSIS — E11.9 TYPE 2 DIABETES MELLITUS WITHOUT COMPLICATION, WITHOUT LONG-TERM CURRENT USE OF INSULIN (MULTI): ICD-10-CM

## 2024-07-17 RX ORDER — TIRZEPATIDE 15 MG/.5ML
15 INJECTION, SOLUTION SUBCUTANEOUS
Qty: 6 ML | Refills: 3 | Status: SHIPPED | OUTPATIENT
Start: 2024-07-17

## 2024-07-17 NOTE — PROGRESS NOTES
Pharmacist Clinic: Diabetes Management  Steven Velasquez is a 64 y.o. male was referred to Clinical Pharmacy Team for diabetes management.   Referring Provider: Giorgio Huynh MD   - Last visit: 2/26/24    Subjective   HPI    HPI    - Finally starting to notice some benefits from the Mounjaro with weight loss!    Diet:  - Appetite is reducing further than before  - Doesn't feel like he's over eating; he finishes all his meals though    Exercise:  - Goes to gym 4 times per week; water jogs    Weight loss: about 10-15 lbs  - Currently at 251 lbs; goal is 20% weight loss    Current monitoring regimen:   Patient is using:  none    Reported blood sugars: none    Any episodes of hypoglycemia? no    Adverse Effects:   No increased side effects     Allergies   Allergen Reactions    Methocarbamol Hives    Methylisothiazolinone Unknown    Primidone Other     Fatigue     Objective     There were no vitals taken for this visit.    Diabetes Pharmacotherapy:    Mounjaro 15 mg weekly Sundays    SECONDARY PREVENTION  - Statin? Yes   - ACE-I/ARB? No  - Aspirin? No    Pertinent PMH Review:  - PMH of Pancreatitis: No  - PMH of Retinopathy: No  - PMH of Urinary Tract Infections: No  - PMH of MTC: No    Lab Review  Lab Results   Component Value Date    BILITOT 1.6 (H) 10/02/2023    CALCIUM 10.3 10/02/2023    CO2 29 10/02/2023     10/02/2023    CREATININE 1.27 10/02/2023    GLUCOSE 102 (H) 10/02/2023    ALKPHOS 61 10/02/2023    K 4.5 10/02/2023    PROT 7.6 10/02/2023     10/02/2023    AST 34 10/02/2023    ALT 48 10/02/2023    BUN 24 (H) 10/02/2023    ANIONGAP 17 10/02/2023    ALBUMIN 4.8 10/02/2023    GFRMALE 59 (A) 04/26/2023     Lab Results   Component Value Date    TRIG 101 10/02/2023    CHOL 228 (H) 10/02/2023    HDL 56.4 10/02/2023     Lab Results   Component Value Date    HGBA1C 5.7 05/28/2024    HGBA1C 6.2 02/26/2024    HGBA1C 5.8 (H) 10/02/2023     The 10-year ASCVD risk score (Zuly CAST, et al., 2019) is: 19.5%     Values used to calculate the score:      Age: 64 years      Sex: Male      Is Non- : No      Diabetic: Yes      Tobacco smoker: No      Systolic Blood Pressure: 124 mmHg      Is BP treated: No      HDL Cholesterol: 56.4 mg/dL      Total Cholesterol: 228 mg/dL    Drug Interactions:  none    Assessment/Plan   Problem List Items Addressed This Visit       Diabetes mellitus, type 2 (Multi)    Relevant Medications    tirzepatide (Mounjaro) 15 mg/0.5 mL pen injector    Other Relevant Orders    Follow Up In [facility] - Pharmacy     Patients diabetes is well controlled with most recent A1c of 5.7% (Goal < 7%).   CONTINUE Mounjaro 15 mg weekly.  Finally starting to see benefits and has lost about 10-15 lbs. Encouraged him to continue working on diet  Compliance at present is estimated to be excellent.   Follow-up: 3 months - 10/9 12pm  Next visit: 11/4/24    Maile Parra, PharmD, [facility]  Clinical Pharmacist     Continue all meds under the continuation of care with the referring provider and clinical pharmacy team.

## 2024-07-19 DIAGNOSIS — E11.9 TYPE 2 DIABETES MELLITUS WITHOUT COMPLICATION, WITHOUT LONG-TERM CURRENT USE OF INSULIN (MULTI): ICD-10-CM

## 2024-07-19 RX ORDER — TIRZEPATIDE 15 MG/.5ML
15 INJECTION, SOLUTION SUBCUTANEOUS
Qty: 6 ML | Refills: 3 | OUTPATIENT
Start: 2024-07-19

## 2024-07-23 DIAGNOSIS — E11.9 TYPE 2 DIABETES MELLITUS WITHOUT COMPLICATION, WITHOUT LONG-TERM CURRENT USE OF INSULIN (MULTI): ICD-10-CM

## 2024-07-23 RX ORDER — TIRZEPATIDE 15 MG/.5ML
15 INJECTION, SOLUTION SUBCUTANEOUS
Qty: 6 ML | Refills: 3 | Status: SHIPPED | OUTPATIENT
Start: 2024-07-23 | End: 2025-07-23

## 2024-07-29 DIAGNOSIS — I70.222 CRITICAL LIMB ISCHEMIA OF LEFT LOWER EXTREMITY (MULTI): Primary | ICD-10-CM

## 2024-07-29 DIAGNOSIS — L97.509 TOE ULCER (MULTI): ICD-10-CM

## 2024-08-06 ENCOUNTER — HOSPITAL ENCOUNTER (OUTPATIENT)
Dept: VASCULAR MEDICINE | Facility: HOSPITAL | Age: 64
Discharge: HOME | End: 2024-08-06
Payer: MEDICARE

## 2024-08-06 ENCOUNTER — APPOINTMENT (OUTPATIENT)
Dept: CARDIOLOGY | Facility: CLINIC | Age: 64
End: 2024-08-06
Payer: MEDICARE

## 2024-08-06 VITALS
SYSTOLIC BLOOD PRESSURE: 129 MMHG | BODY MASS INDEX: 40.18 KG/M2 | RESPIRATION RATE: 18 BRPM | DIASTOLIC BLOOD PRESSURE: 85 MMHG | HEART RATE: 82 BPM | WEIGHT: 250 LBS | HEIGHT: 66 IN

## 2024-08-06 DIAGNOSIS — E78.5 HLD (HYPERLIPIDEMIA): Primary | ICD-10-CM

## 2024-08-06 DIAGNOSIS — L97.509 TOE ULCER (MULTI): ICD-10-CM

## 2024-08-06 DIAGNOSIS — I73.9 PERIPHERAL VASCULAR DISEASE, UNSPECIFIED (CMS-HCC): ICD-10-CM

## 2024-08-06 DIAGNOSIS — R60.0 BILATERAL LOWER EXTREMITY EDEMA: ICD-10-CM

## 2024-08-06 DIAGNOSIS — I70.222 CRITICAL LIMB ISCHEMIA OF LEFT LOWER EXTREMITY (MULTI): ICD-10-CM

## 2024-08-06 DIAGNOSIS — I25.10 CAD (CORONARY ARTERY DISEASE): ICD-10-CM

## 2024-08-06 PROCEDURE — 99205 OFFICE O/P NEW HI 60 MIN: CPT | Performed by: INTERNAL MEDICINE

## 2024-08-06 PROCEDURE — 3074F SYST BP LT 130 MM HG: CPT | Performed by: INTERNAL MEDICINE

## 2024-08-06 PROCEDURE — 3048F LDL-C <100 MG/DL: CPT | Performed by: INTERNAL MEDICINE

## 2024-08-06 PROCEDURE — 3008F BODY MASS INDEX DOCD: CPT | Performed by: INTERNAL MEDICINE

## 2024-08-06 PROCEDURE — 93922 UPR/L XTREMITY ART 2 LEVELS: CPT

## 2024-08-06 PROCEDURE — 3079F DIAST BP 80-89 MM HG: CPT | Performed by: INTERNAL MEDICINE

## 2024-08-06 PROCEDURE — 93922 UPR/L XTREMITY ART 2 LEVELS: CPT | Performed by: SURGERY

## 2024-08-06 ASSESSMENT — ENCOUNTER SYMPTOMS
DEPRESSION: 0
LOSS OF SENSATION IN FEET: 0
OCCASIONAL FEELINGS OF UNSTEADINESS: 0

## 2024-08-06 NOTE — PATIENT INSTRUCTIONS
.It was a pleasure taking care of you today and appreciate your seeing us at our [facility].     Today's plan is as follows:  Recommendations:   Cardiac work up: ECHO, EKG, Ca score test  Blood work: CBC, BNP, Thyroid, Lipid panel, CRP.  We will contact you with any abnormal results.       FOLLOW UP IN 6 MONTHS           Please call the office with any questions at [phone], press option 1  If you need coordinating your appointments and testing you can do these at the  or by calling my office shortly after your visit.

## 2024-08-06 NOTE — PROGRESS NOTES
Patient here for second opinion       History of Present Illness  Steven Velasquez is a 64 y.o. year old male patient with hx of Diabetes mellitus, type 2 , Parkinson's disease, and morbid obesity.  He has noticed changes in his skin of both lower extremities from mid shin down to the ankle. Per patient he was seen by Dr. [facility], which ordered a Venous US that confirmed bilateral venous insufficiency, negative for DVT. Patient had a RLE ablation done and left LLE scheduled for the end of August. On the exam patient complains of bilateral swelling, discoloration at the ankle site. Denies claudication on ambulation or rest pain. He is presenting today for a second opinion to rule out arterial disease.     Past Medical History  Past Medical History:   Diagnosis Date    Anaphylactic shock, unspecified, subsequent encounter 05/10/2018    Anaphylaxis, subsequent encounter    Elevated blood-pressure reading, without diagnosis of hypertension 2020    Elevated blood pressure reading without diagnosis of hypertension    Personal history of diseases of the blood and blood-forming organs and certain disorders involving the immune mechanism     History of polycythemia    Personal history of other specified conditions 2015    History of epigastric pain       Past Surgical History  Past Surgical History:   Procedure Laterality Date    OTHER SURGICAL HISTORY  2016    Tenotomy Lateral Epicondyle Elbow Open With Tendon Reattachment    OTHER SURGICAL HISTORY  2016    Palatopharyngoplasty    OTHER SURGICAL HISTORY  10/06/2021    Transforaminal lumbar interbody fusion    OTHER SURGICAL HISTORY  10/06/2021    Lumbar laminectomy       Social History  Social History     Tobacco Use    Smoking status: Former     Current packs/day: 0.00     Types: Cigarettes     Quit date: 1980     Years since quittin.6     Passive exposure: Past    Smokeless tobacco: Never   Vaping Use    Vaping status: Never  Used   Substance Use Topics    Alcohol use: Not Currently    Drug use: Never       Family History     Family History   Problem Relation Name Age of Onset    Osteoarthritis Mother      Osteoporosis Mother      Lung cancer Father      Parkinsonism Mother's Sister         Review of Systems  As per HPI, all other systems reviewed and negative.    Allergies:  Allergies   Allergen Reactions    Methocarbamol Hives    Methylisothiazolinone Unknown    Primidone Other     Fatigue        Outpatient Medications:  Current Outpatient Medications   Medication Instructions    amantadine (Symmetrel) 100 mg tablet 1 tablet, oral, 3 times daily    apple cider vinegar 500 mg, oral, 3 times daily    betamethasone dipropionate 0.05 % cream 1 Application    celecoxib (CELEBREX) 200 mg, oral, Daily PRN    cholecalciferol (Vitamin D-3) 5,000 Units tablet 1 tablet, oral, Daily    chromium picolinate 200 mcg tablet tablet 1 tablet, oral, 3 times daily    clindamycin (Cleocin T) 1 % lotion 1 Application    coenzyme Q-10 100 mg, oral, Daily    cyclobenzaprine (FLEXERIL) 5 mg, oral, 2 times daily PRN    gabapentin (Neurontin) 300 mg capsule Take 4 tabs nightly    hydrocortisone 2.5 % cream 1 Application    ketoconazole (NIZOral) 2 % shampoo 5 mL, Topical, TO FOREHEAD 2-3 TIMES A WEEK    melatonin 15 mg, oral, Nightly PRN    metroNIDAZOLE (Metrogel) 1 % gel 1 Application    Mounjaro 15 mg, subcutaneous, Every 7 days    multivit-iron-minerals-folic acid (Centrum Silver) 0.4 mg-300 mcg- 250 mcg tab 1 tablet, oral, Daily    Myrbetriq 50 mg, oral, Daily    nicotinamide riboside chloride (NICOTINAMIDE RIBOSIDE, BULK, MISC) 300 mg, oral, Daily    polyethylene glycol (GLYCOLAX, MIRALAX) 17 g, Daily RT    pramipexole (Mirapex) 0.125 mg tablet 1 tablet, oral, 3 times daily    pramipexole (Mirapex) 0.5 mg tablet 1 tablet, oral, 3 times daily    pravastatin (PRAVACHOL) 40 mg, oral, Daily    saw palmetto 1,350 mg, oral, 3 times daily    tadalafil (Cialis) 20  mg tablet One tab nightly for raynauds    tadalafil (CIALIS) 5 mg, oral, Daily PRN    tadalafil (CIALIS) 5 mg, oral, Daily    triamcinolone (Kenalog) 0.1 % cream Topical, 2 times daily, to elbows and fingertips    trihexyphenidyl (ARTANE) 1 mg, oral, 3 times daily    zolpidem (AMBIEN) 10 mg, oral, Nightly PRN         Vitals:  There were no vitals filed for this visit.    Physical Exam:  Physical Exam  Constitutional:       Appearance: Normal appearance.   Pulmonary:      Effort: Pulmonary effort is normal.   Musculoskeletal:         General: Normal range of motion.      Right lower leg: Edema present.      Left lower leg: Edema present.   Skin:     General: Skin is warm.      Comments: Shin discoloration bilaterally    Neurological:      General: No focal deficit present.      Mental Status: He is alert and oriented to person, place, and time.             Reviewed Study(s):    Vascular Studies  Imaging & Doppler Findings:  RIGHT Lower PVR                Pressures Ratios  Right Posterior Tibial (Ankle) 161 mmHg  1.25  Right Dorsalis Pedis (Ankle)   159 mmHg  1.23  Right Digit (Great Toe)        116 mmHg  0.90     LEFT Lower PVR                Pressures Ratios  Left Posterior Tibial (Ankle) 159 mmHg  1.23  Left Dorsalis Pedis (Ankle)   146 mmHg  1.13  Left Digit (Great Toe)        126 mmHg  0.98        Assessment/Plan   No evidence of arterial occlusive disease bilaterally in the lower extremities, normal perfusion based on vascular testing.   Recommendations: Cardiac work up: ECHO, EKG, Ca score test  Blood work: CBC, BNP, Thyroid, Lipid panel, CRP  Follow up in 6 mo    Patrice Mcbride DO

## 2024-08-08 ENCOUNTER — LAB (OUTPATIENT)
Dept: LAB | Facility: LAB | Age: 64
End: 2024-08-08
Payer: MEDICARE

## 2024-08-08 DIAGNOSIS — I25.10 CAD (CORONARY ARTERY DISEASE): ICD-10-CM

## 2024-08-08 DIAGNOSIS — R60.0 BILATERAL LOWER EXTREMITY EDEMA: ICD-10-CM

## 2024-08-08 DIAGNOSIS — E78.5 HLD (HYPERLIPIDEMIA): ICD-10-CM

## 2024-08-08 LAB
CHOLEST SERPL-MCNC: 163 MG/DL (ref 0–199)
CHOLESTEROL/HDL RATIO: 3.8
CRP SERPL HS-MCNC: 3.8 MG/L
ERYTHROCYTE [DISTWIDTH] IN BLOOD BY AUTOMATED COUNT: 13.1 % (ref 11.5–14.5)
HCT VFR BLD AUTO: 49.6 % (ref 41–52)
HDLC SERPL-MCNC: 43.1 MG/DL
HGB BLD-MCNC: 16.6 G/DL (ref 13.5–17.5)
LDLC SERPL CALC-MCNC: 85 MG/DL
MCH RBC QN AUTO: 29.9 PG (ref 26–34)
MCHC RBC AUTO-ENTMCNC: 33.5 G/DL (ref 32–36)
MCV RBC AUTO: 89 FL (ref 80–100)
NON HDL CHOLESTEROL: 120 MG/DL (ref 0–149)
NRBC BLD-RTO: 0 /100 WBCS (ref 0–0)
PLATELET # BLD AUTO: 171 X10*3/UL (ref 150–450)
RBC # BLD AUTO: 5.55 X10*6/UL (ref 4.5–5.9)
TRIGL SERPL-MCNC: 174 MG/DL (ref 0–149)
TSH SERPL-ACNC: 3.88 MIU/L (ref 0.44–3.98)
VLDL: 35 MG/DL (ref 0–40)
WBC # BLD AUTO: 7.5 X10*3/UL (ref 4.4–11.3)

## 2024-08-08 PROCEDURE — 80061 LIPID PANEL: CPT

## 2024-08-08 PROCEDURE — 85027 COMPLETE CBC AUTOMATED: CPT

## 2024-08-08 PROCEDURE — 84443 ASSAY THYROID STIM HORMONE: CPT

## 2024-08-08 PROCEDURE — 36415 COLL VENOUS BLD VENIPUNCTURE: CPT

## 2024-08-08 PROCEDURE — 86141 C-REACTIVE PROTEIN HS: CPT

## 2024-08-10 ENCOUNTER — HOSPITAL ENCOUNTER (OUTPATIENT)
Dept: RADIOLOGY | Facility: CLINIC | Age: 64
Discharge: HOME | End: 2024-08-10

## 2024-08-10 DIAGNOSIS — E78.5 HLD (HYPERLIPIDEMIA): ICD-10-CM

## 2024-08-10 PROCEDURE — 75571 CT HRT W/O DYE W/CA TEST: CPT

## 2024-08-13 ENCOUNTER — APPOINTMENT (OUTPATIENT)
Dept: CARDIOLOGY | Facility: HOSPITAL | Age: 64
End: 2024-08-13
Payer: MEDICARE

## 2024-08-13 ENCOUNTER — HOSPITAL ENCOUNTER (OUTPATIENT)
Dept: CARDIOLOGY | Facility: CLINIC | Age: 64
Discharge: HOME | End: 2024-08-13
Payer: MEDICARE

## 2024-08-13 ENCOUNTER — APPOINTMENT (OUTPATIENT)
Dept: CARDIOLOGY | Facility: CLINIC | Age: 64
End: 2024-08-13
Payer: MEDICARE

## 2024-08-13 DIAGNOSIS — R60.0 BILATERAL LOWER EXTREMITY EDEMA: ICD-10-CM

## 2024-08-13 DIAGNOSIS — I25.10 CAD (CORONARY ARTERY DISEASE): ICD-10-CM

## 2024-08-13 PROCEDURE — 93010 ELECTROCARDIOGRAM REPORT: CPT | Performed by: INTERNAL MEDICINE

## 2024-08-13 PROCEDURE — 93005 ELECTROCARDIOGRAM TRACING: CPT

## 2024-08-14 LAB
ATRIAL RATE: 64 BPM
P AXIS: 55 DEGREES
P OFFSET: 172 MS
P ONSET: 114 MS
PR INTERVAL: 186 MS
Q ONSET: 207 MS
QRS COUNT: 11 BEATS
QRS DURATION: 100 MS
QT INTERVAL: 392 MS
QTC CALCULATION(BAZETT): 404 MS
QTC FREDERICIA: 400 MS
R AXIS: 20 DEGREES
T AXIS: 22 DEGREES
T OFFSET: 403 MS
VENTRICULAR RATE: 64 BPM

## 2024-10-09 ENCOUNTER — APPOINTMENT (OUTPATIENT)
Dept: PHARMACY | Facility: HOSPITAL | Age: 64
End: 2024-10-09
Payer: MEDICARE

## 2024-10-09 DIAGNOSIS — E11.9 TYPE 2 DIABETES MELLITUS WITHOUT COMPLICATION, WITHOUT LONG-TERM CURRENT USE OF INSULIN (MULTI): ICD-10-CM

## 2024-10-09 DIAGNOSIS — E66.01 MORBID OBESITY (MULTI): Primary | ICD-10-CM

## 2024-10-09 NOTE — ASSESSMENT & PLAN NOTE
Patient's goal A1c is < 7%.  Is pt at goal? Yes, A1c 5.8%  Patient does not monitor BG.   Patient was insistent that he does not have diabetes diagnosis.   Per chart review diabetes dx 2011.    Medication Changes:  CONTINUE:  Mounjaro 15 mg weekly    Future Considerations:  Consider updated A1c levels at next appointment

## 2024-10-09 NOTE — PROGRESS NOTES
Clinical Pharmacy Appointment    Patient ID: Steven Velasquez is a 64 y.o. male who presents for Obesity.    Pt is here for Follow Up appointment.     Referring Provider: Giorgio Huynh MD  PCP: Giorgio Huynh MD   Last visit with PCP: 5/28/24   Next visit with PCP: 11/4/24    Subjective     HPI    WEIGHT LOSS  BMI Readings from Last 3 Encounters:   08/06/24 40.35 kg/m²   05/28/24 43.22 kg/m²   05/03/24 43.67 kg/m²      Starting weight: 265 lbs. (A few years ago)   Current weight: 248 lbs.  Down about 1-2 pounds since July     Lifestyle  Diet: 2-3 meals/day. Skips lunch   BK: yogurt, eggs sometimes cheese and oatmeal   LN: sometimes skips, chicken salad spread, burger (turkey)   DN: protein (chicken or turkey)  salad, usually eats at home   Snacks: varies if home or not, usually fruits/ cheese   Drinks: crystal light, water, diet pops occasionally   Has pastas and rice few times a week   Has worked with nutritionist/dietician? Yes, years ago, does not think was helpful   Physical Activity: 3-5 days per week, riding bike, PT weekly         Non-Pharmacological Therapy  Weight loss techniques attempted:  Self-directed dieting: little results   Commercial weight loss program: little results     Pharmacological Therapy  Current Medications: mounjaro 15 mg weekly since October 2023  Adverse Effects: no issues, small injection site reaction   Previous Medications: was on trulicity, no results      Insurance coverage of weight-loss medications? Yes   Eligible for copay cards/programs? No, Medicare     Medication Estimated Cost Expected weight loss Patient Risks/Cautions Notes   Wegovy (semaglutide) ~$650/month w/ savings card 10-20% None      Zepbound (tirzepatide) $650/month   w/ savings card 10-20%     Qsymia (phentermine-topiramate) $98/month via Qsymia [employer] 5-10% None Controlled substance   Contrave (bupropion-naltrexone) $99/month   via [employer] 5-10% None    Adipex (phentermine) ~$15/month 3-5% None  Controlled substance,  Short-term use only   Davon (OTC Orlistat) ~$60/month 3-5%  Adverse effects likely outweigh benefit.         Objective   Allergies   Allergen Reactions    Methocarbamol Hives    Methylisothiazolinone Unknown    Primidone Other     Fatigue     Social History     Social History Narrative    Not on file      Medication Review  Current Outpatient Medications   Medication Instructions    amantadine (Symmetrel) 100 mg tablet 1 tablet, oral, 3 times daily    apple cider vinegar 500 mg, oral, 3 times daily    betamethasone dipropionate 0.05 % cream 1 Application    celecoxib (CELEBREX) 200 mg, oral, Daily PRN    cholecalciferol (Vitamin D-3) 5,000 Units tablet 1 tablet, oral, Daily    chromium picolinate 200 mcg tablet tablet 1 tablet, oral, 3 times daily    clindamycin (Cleocin T) 1 % lotion 1 Application    coenzyme Q-10 100 mg, oral, Daily    cyclobenzaprine (FLEXERIL) 5 mg, oral, 2 times daily PRN    gabapentin (Neurontin) 300 mg capsule Take 4 tabs nightly    hydrocortisone 2.5 % cream 1 Application    melatonin 15 mg, oral, Nightly PRN    metroNIDAZOLE (Metrogel) 1 % gel 1 Application    Mounjaro 15 mg, subcutaneous, Every 7 days    multivit-iron-minerals-folic acid (Centrum Silver) 0.4 mg-300 mcg- 250 mcg tab 1 tablet, oral, Daily    Myrbetriq 50 mg, oral, Daily    nicotinamide riboside chloride (NICOTINAMIDE RIBOSIDE, BULK, MISC) 300 mg, oral, Daily    polyethylene glycol (GLYCOLAX, MIRALAX) 17 g, Daily RT    pramipexole (Mirapex) 0.125 mg tablet 1 tablet, oral, 3 times daily    pramipexole (Mirapex) 0.5 mg tablet 1 tablet, oral, 3 times daily    pravastatin (PRAVACHOL) 40 mg, oral, Daily    saw palmetto 1,350 mg, oral, 3 times daily    tadalafil (CIALIS) 5 mg, oral, Daily PRN    tadalafil (CIALIS) 5 mg, oral, Daily    trihexyphenidyl (ARTANE) 1 mg, oral, 3 times daily    zolpidem (AMBIEN) 10 mg, oral, Nightly PRN      Vitals  BP Readings from Last 2 Encounters:   08/06/24 129/85   05/28/24  124/76     BMI Readings from Last 1 Encounters:   08/06/24 40.35 kg/m²      Labs  A1C  Lab Results   Component Value Date    HGBA1C 5.7 05/28/2024    HGBA1C 6.2 02/26/2024    HGBA1C 5.8 (H) 10/02/2023     BMP  Lab Results   Component Value Date    CALCIUM 10.3 10/02/2023     10/02/2023    K 4.5 10/02/2023    CO2 29 10/02/2023     10/02/2023    BUN 24 (H) 10/02/2023    CREATININE 1.27 10/02/2023    EGFR 64 10/02/2023     LFTs  Lab Results   Component Value Date    ALT 48 10/02/2023    AST 34 10/02/2023    ALKPHOS 61 10/02/2023    BILITOT 1.6 (H) 10/02/2023     FLP  Lab Results   Component Value Date    TRIG 174 (H) 08/08/2024    CHOL 163 08/08/2024    LDLF 100 (H) 10/04/2022    LDLCALC 85 08/08/2024    HDL 43.1 08/08/2024     Urine Microalbumin  Lab Results   Component Value Date    MICROALBCREA 11.3 10/04/2022     Weight Management  Wt Readings from Last 3 Encounters:   08/06/24 113 kg (250 lb)   05/28/24 114 kg (251 lb 12.8 oz)   05/03/24 115 kg (254 lb 6.4 oz)      There is no height or weight on file to calculate BMI.     Assessment/Plan   Problem List Items Addressed This Visit       Diabetes mellitus, type 2 (Multi)     Patient's goal A1c is < 7%.  Is pt at goal? Yes, A1c 5.8%  Patient does not monitor BG.   Patient was insistent that he does not have diabetes diagnosis.   Per chart review diabetes dx 2011.    Medication Changes:  CONTINUE:  Mounjaro 15 mg weekly    Future Considerations:  Consider updated A1c levels at next appointment          Relevant Orders    Referral to Clinical Pharmacy    Morbid obesity (Multi) - Primary     Current regimen includes mounjaro 15 mg weekly. Patient's current weight reported as 248 lbs. Has lost 18 lbs (6.5% of TBW) since starting therapy plan. Due to plateau of weight loss, plan to continue mounjaro 15 mg weekly. Plan to be more mindful of diet, portion sizes, and carbohydrate intake.    Medication Changes:  CONTINUE  Mounjaro 15 mg weekly    Future  Considerations:  May consider increasing physical activity intensity in the future if diet changes do not impact weight loss    Monitoring and Education:  Counseled patient on relevant MOA, expectations, side effects, duration of therapy, contraindications, administration, and monitoring parameters.  All questions and concerns addressed. Contact pharmacist with any further questions or concerns prior to next appointment.  Reviewed dietary recommendations: Healthy Plate method          Relevant Orders    Referral to Clinical Pharmacy       Clinical Pharmacist follow-up: 12/4 at noon , Telehealth visit    Continue all meds under the continuation of care with the referring provider and clinical pharmacy team.    Thank you,  Kenisha Smith, PharmD  Clinical Pharmacist  [phone]     Verbal consent to manage patient's drug therapy was obtained from the patient. They were informed they may decline to participate or withdraw from participation in pharmacy services at any time.

## 2024-10-09 NOTE — ASSESSMENT & PLAN NOTE
Current regimen includes mounjaro 15 mg weekly. Patient's current weight reported as 248 lbs. Has lost 18 lbs (6.5% of TBW) since starting therapy plan. Due to plateau of weight loss, plan to continue mounjaro 15 mg weekly. Plan to be more mindful of diet, portion sizes, and carbohydrate intake.    Medication Changes:  CONTINUE  Mounjaro 15 mg weekly    Future Considerations:  May consider increasing physical activity intensity in the future if diet changes do not impact weight loss    Monitoring and Education:  Counseled patient on relevant MOA, expectations, side effects, duration of therapy, contraindications, administration, and monitoring parameters.  All questions and concerns addressed. Contact pharmacist with any further questions or concerns prior to next appointment.  Reviewed dietary recommendations: Healthy Plate method

## 2024-10-28 ENCOUNTER — APPOINTMENT (OUTPATIENT)
Dept: PRIMARY CARE | Facility: CLINIC | Age: 64
End: 2024-10-28
Payer: MEDICARE

## 2024-11-04 ENCOUNTER — APPOINTMENT (OUTPATIENT)
Dept: PRIMARY CARE | Facility: CLINIC | Age: 64
End: 2024-11-04
Payer: MEDICARE

## 2024-11-04 ENCOUNTER — LAB (OUTPATIENT)
Dept: LAB | Facility: LAB | Age: 64
End: 2024-11-04
Payer: MEDICARE

## 2024-11-04 VITALS
DIASTOLIC BLOOD PRESSURE: 66 MMHG | TEMPERATURE: 98.3 F | WEIGHT: 238.8 LBS | HEART RATE: 92 BPM | HEIGHT: 66 IN | BODY MASS INDEX: 38.38 KG/M2 | SYSTOLIC BLOOD PRESSURE: 110 MMHG

## 2024-11-04 DIAGNOSIS — E11.9 TYPE 2 DIABETES MELLITUS WITHOUT COMPLICATION, WITHOUT LONG-TERM CURRENT USE OF INSULIN (MULTI): ICD-10-CM

## 2024-11-04 DIAGNOSIS — E11.9 TYPE 2 DIABETES MELLITUS WITHOUT COMPLICATION, WITHOUT LONG-TERM CURRENT USE OF INSULIN (MULTI): Primary | ICD-10-CM

## 2024-11-04 DIAGNOSIS — Z00.00 PREVENTATIVE HEALTH CARE: ICD-10-CM

## 2024-11-04 DIAGNOSIS — G47.33 OBSTRUCTIVE SLEEP APNEA: ICD-10-CM

## 2024-11-04 DIAGNOSIS — E66.01 MORBID OBESITY (MULTI): ICD-10-CM

## 2024-11-04 DIAGNOSIS — G20.A1 PARKINSON'S DISEASE WITHOUT DYSKINESIA OR FLUCTUATING MANIFESTATIONS: ICD-10-CM

## 2024-11-04 DIAGNOSIS — E78.00 HYPERCHOLESTEROLEMIA: ICD-10-CM

## 2024-11-04 LAB
ALBUMIN SERPL BCP-MCNC: 4.7 G/DL (ref 3.4–5)
ALP SERPL-CCNC: 66 U/L (ref 33–136)
ALT SERPL W P-5'-P-CCNC: 32 U/L (ref 10–52)
ANION GAP SERPL CALC-SCNC: 11 MMOL/L (ref 10–20)
AST SERPL W P-5'-P-CCNC: 25 U/L (ref 9–39)
BASOPHILS # BLD AUTO: 0.04 X10*3/UL (ref 0–0.1)
BASOPHILS NFR BLD AUTO: 0.6 %
BILIRUB SERPL-MCNC: 1 MG/DL (ref 0–1.2)
BUN SERPL-MCNC: 20 MG/DL (ref 6–23)
CALCIUM SERPL-MCNC: 9.6 MG/DL (ref 8.6–10.3)
CHLORIDE SERPL-SCNC: 102 MMOL/L (ref 98–107)
CO2 SERPL-SCNC: 29 MMOL/L (ref 21–32)
CREAT SERPL-MCNC: 1.39 MG/DL (ref 0.5–1.3)
EGFRCR SERPLBLD CKD-EPI 2021: 57 ML/MIN/1.73M*2
EOSINOPHIL # BLD AUTO: 0.13 X10*3/UL (ref 0–0.7)
EOSINOPHIL NFR BLD AUTO: 1.9 %
ERYTHROCYTE [DISTWIDTH] IN BLOOD BY AUTOMATED COUNT: 13.2 % (ref 11.5–14.5)
GLUCOSE SERPL-MCNC: 84 MG/DL (ref 74–99)
HCT VFR BLD AUTO: 50.6 % (ref 41–52)
HGB BLD-MCNC: 16.8 G/DL (ref 13.5–17.5)
IMM GRANULOCYTES # BLD AUTO: 0.01 X10*3/UL (ref 0–0.7)
IMM GRANULOCYTES NFR BLD AUTO: 0.1 % (ref 0–0.9)
LYMPHOCYTES # BLD AUTO: 2.12 X10*3/UL (ref 1.2–4.8)
LYMPHOCYTES NFR BLD AUTO: 31.1 %
MCH RBC QN AUTO: 30.5 PG (ref 26–34)
MCHC RBC AUTO-ENTMCNC: 33.2 G/DL (ref 32–36)
MCV RBC AUTO: 92 FL (ref 80–100)
MONOCYTES # BLD AUTO: 0.68 X10*3/UL (ref 0.1–1)
MONOCYTES NFR BLD AUTO: 10 %
NEUTROPHILS # BLD AUTO: 3.83 X10*3/UL (ref 1.2–7.7)
NEUTROPHILS NFR BLD AUTO: 56.3 %
NRBC BLD-RTO: 0 /100 WBCS (ref 0–0)
PLATELET # BLD AUTO: 190 X10*3/UL (ref 150–450)
POC APPEARANCE, URINE: CLEAR
POC BILIRUBIN, URINE: NEGATIVE
POC BLOOD, URINE: NEGATIVE
POC COLOR, URINE: ABNORMAL
POC GLUCOSE, URINE: NEGATIVE MG/DL
POC HEMOGLOBIN A1C: 5.4 % (ref 4.2–6.5)
POC KETONES, URINE: NEGATIVE MG/DL
POC LEUKOCYTES, URINE: NEGATIVE
POC NITRITE,URINE: NEGATIVE
POC PH, URINE: 6 PH
POC PROTEIN, URINE: NEGATIVE MG/DL
POC SPECIFIC GRAVITY, URINE: >=1.03
POC UROBILINOGEN, URINE: 0.2 EU/DL
POTASSIUM SERPL-SCNC: 4.3 MMOL/L (ref 3.5–5.3)
PROT SERPL-MCNC: 7.3 G/DL (ref 6.4–8.2)
PSA SERPL-MCNC: 0.97 NG/ML
RBC # BLD AUTO: 5.5 X10*6/UL (ref 4.5–5.9)
SODIUM SERPL-SCNC: 138 MMOL/L (ref 136–145)
WBC # BLD AUTO: 6.8 X10*3/UL (ref 4.4–11.3)

## 2024-11-04 PROCEDURE — 36415 COLL VENOUS BLD VENIPUNCTURE: CPT

## 2024-11-04 PROCEDURE — 85025 COMPLETE CBC W/AUTO DIFF WBC: CPT

## 2024-11-04 PROCEDURE — 83036 HEMOGLOBIN GLYCOSYLATED A1C: CPT | Performed by: INTERNAL MEDICINE

## 2024-11-04 PROCEDURE — 3074F SYST BP LT 130 MM HG: CPT | Performed by: INTERNAL MEDICINE

## 2024-11-04 PROCEDURE — 84153 ASSAY OF PSA TOTAL: CPT

## 2024-11-04 PROCEDURE — 80053 COMPREHEN METABOLIC PANEL: CPT

## 2024-11-04 PROCEDURE — 81003 URINALYSIS AUTO W/O SCOPE: CPT | Performed by: INTERNAL MEDICINE

## 2024-11-04 PROCEDURE — 3048F LDL-C <100 MG/DL: CPT | Performed by: INTERNAL MEDICINE

## 2024-11-04 PROCEDURE — 99396 PREV VISIT EST AGE 40-64: CPT | Performed by: INTERNAL MEDICINE

## 2024-11-04 PROCEDURE — 3008F BODY MASS INDEX DOCD: CPT | Performed by: INTERNAL MEDICINE

## 2024-11-04 PROCEDURE — 3078F DIAST BP <80 MM HG: CPT | Performed by: INTERNAL MEDICINE

## 2024-11-04 RX ORDER — EPINEPHRINE 0.22MG
100 AEROSOL WITH ADAPTER (ML) INHALATION DAILY
COMMUNITY

## 2024-11-04 RX ORDER — CYCLOBENZAPRINE HCL 5 MG
5 TABLET ORAL 2 TIMES DAILY PRN
Qty: 180 TABLET | Refills: 3 | Status: SHIPPED | OUTPATIENT
Start: 2024-11-04 | End: 2025-11-04

## 2024-11-04 NOTE — PROGRESS NOTES
"Subjective   Patient ID: Steven Velasquez is a 64 y.o. male who presents for Annual Exam.  Steven presents for \"physical\". Recent evaluation at urology, Dustin Pagan showed 70ml residual. Ongoing care with Kp Cardenas for PD. No change in management.      Current Outpatient Medications   Medication Instructions    amantadine (Symmetrel) 100 mg tablet 1 tablet, 3 times daily    apple cider vinegar 500 mg, oral, 3 times daily    betamethasone dipropionate 0.05 % cream 1 Application    cholecalciferol (Vitamin D-3) 5,000 Units tablet 1 tablet, Daily    chromium picolinate 200 mcg tablet tablet 1 tablet, 3 times daily    clindamycin (Cleocin T) 1 % lotion 1 Application    coenzyme Q-10 100 mg, Daily    cyclobenzaprine (FLEXERIL) 5 mg, 2 times daily PRN    gabapentin (Neurontin) 300 mg capsule Take 4 tabs nightly    hydrocortisone 2.5 % cream 1 Application    melatonin 15 mg, Nightly PRN    metroNIDAZOLE (Metrogel) 1 % gel 1 Application    Mounjaro 15 mg, subcutaneous, Every 7 days    multivit-iron-minerals-folic acid (Centrum Silver) 0.4 mg-300 mcg- 250 mcg tab 1 tablet, Daily    Myrbetriq 50 mg, Daily    nicotinamide riboside chloride (NICOTINAMIDE RIBOSIDE, BULK, MISC) 300 mg, Daily    polyethylene glycol (GLYCOLAX, MIRALAX) 17 g, Daily RT    pramipexole (Mirapex) 0.125 mg tablet 1 tablet, 3 times daily    pramipexole (Mirapex) 0.5 mg tablet 1 tablet, 3 times daily    pravastatin (PRAVACHOL) 40 mg, oral, Daily    saw palmetto 1,350 mg, oral, 3 times daily    tadalafil (CIALIS) 5 mg, Daily PRN    trihexyphenidyl (ARTANE) 1 mg, oral, 3 times daily    zolpidem (AMBIEN) 10 mg, Nightly PRN     Review of Systems  General: Denies weakness, fever, anorexia. Denies significant change in weight.  HEENT: Denies HA, vision change or problem, hearing loss or tinnitus, voice or swallowing problems.  Occasional HA. Always has been soft-spoken, no particular change.  Resp: Denies SOB, cough, or wheezing.  CV: Denies chest pain or " "pressure, palpitations, syncope, edema, or claudication.  GI : Denies abdominal pain, diarrhea, constipation, heartburn, rectal bleeding, or change in bowel habits. Uses daily Miralax, 2-3 BM/week. Colonoscopy in '22 was done after + Cologuard. Colon not well visualized, recommended repeat in 3-5 years.  : Denies urinary frequency, pain, blood, incontinence, or nocturia.Some urgency  Sexual: No sexual health or reproductive system concerns.  MSK: Denies significant musculoskeletal pains or limitations EXCEPT AS FOLLOWS...Some lower back pain, R foot cubital tunnel sx.  Neuro: Denies tingling, numbness, weakness, tremor, balance problems, falls, or memory loss. See HPI. No significant progression of PT.  Psych: Denies Mood or sleep issues. Compliant with CPAP. Average sleep duration 4-6 hours/HS.  Derm: No unusual lesions, moles or rashes.    Objective   /66 (BP Location: Left arm, Patient Position: Sitting, BP Cuff Size: Adult)   Pulse 92   Temp 36.8 °C (98.3 °F) (Temporal)   Ht 1.676 m (5' 6\")   Wt 108 kg (238 lb 12.8 oz)   BMI 38.54 kg/m²   Physical Exam  Constitutional: Alert and in no acute distress  Inspection of Eyes: Sclera and conjunctivae normal.  Pupil exam: PERRL. EOMI.  Tympanic membranes are normal. External ears appear normal.   Oropharynx: Normal with MMM. Absent uvula.  Neck: Thyroid normal. No cervical lymphadenopathy. No carotid bruit.  No cervical, axillary, or inguinal lymphadenopathy.  Breasts: No masses.   Lungs are clear to auscultation and percussion.  Cardiac: S1 and S2 are normal. No murmurs, rubs, or gallops. No edema.   Abdomen: Soft, nontender. Normal bowel sounds. No hepatosplenomegaly or masses.  Musculoskeletal: Examination of gait is normal. No clubbing or cyanosis. Full range of motion of joints. NO swelling, tenderness, or limitation of motion.  Skin mild changes of venous stasis B with more prominent confluent hyperpigmentation in patches of B medial shins. No visible " rash. Nml skin turgor.  Neurological: Cranial nerves II-XII intact.  No focal motor weakness. No pronator drift. At times, mild resting tremor. Balance fair.  Psychiatric: A&Ox3. Mood and affect normal.      Assessment/Plan

## 2024-11-06 NOTE — RESULT ENCOUNTER NOTE
Dear Patient,    GOOD NEWS    Attached to this note is a copy of the testing that I have ordered.The results indicate that there are no significant abnormalities in your results, even if some of the findings are reported as abnormal.    Please continue your current treatment plan as we have discussed and return for follow up as planned.    Do not hesitate to contact me if you have any questions or concerns.    Sincerely,  Giorgio Huynh M.D.

## 2024-11-21 ENCOUNTER — TELEPHONE (OUTPATIENT)
Dept: PRIMARY CARE | Facility: CLINIC | Age: 64
End: 2024-11-21
Payer: MEDICARE

## 2024-12-04 ENCOUNTER — APPOINTMENT (OUTPATIENT)
Dept: PHARMACY | Facility: HOSPITAL | Age: 64
End: 2024-12-04
Payer: MEDICARE

## 2024-12-04 DIAGNOSIS — E11.9 TYPE 2 DIABETES MELLITUS WITHOUT COMPLICATION, WITHOUT LONG-TERM CURRENT USE OF INSULIN (MULTI): ICD-10-CM

## 2024-12-04 DIAGNOSIS — E66.01 MORBID OBESITY (MULTI): ICD-10-CM

## 2024-12-04 NOTE — ASSESSMENT & PLAN NOTE
Patient appears to have reached a weight loss plateau with Mounjaro 15 mg. Due to previous trial of Trulicity, there is likely not much benefit to be seen from switching GLP's. He does not report any adverse effects of the medication, so therapy is safe to continue at this time. Will reassess after he establishes care with his new PCP Dr. Cooper.

## 2024-12-04 NOTE — PROGRESS NOTES
I reviewed the progress note and agree with the resident’s findings and plans as written. Case discussed with resident.    Vanna Subramanian, PharmD  Clinical Pharmacy Specialist   [phone]

## 2024-12-04 NOTE — PROGRESS NOTES
Clinical Pharmacy Appointment    Patient ID: Steven Velasquez is a 64 y.o. male who presents for weight management.    Pt is here for Follow Up appointment. Last appointment was completed by Kenisha Smith on 10/09/2024.    Referring Provider: Giorgio Huynh MD  PCP: Giorgio Huynh MD   Last visit with PCP: 11/4/24 (ALEXIS Huynh, retiring)  Next visit with PCP: 1/27/2025 (MEREDITH Cooper)    Subjective   Review of Past Appointment:  Mr. Velasquez has a relevant PMH of T2DM, hypercholesterolemia, obesity, and Parkinsons disease. Regarding T2DM, patients A1C remains well controlled at 5.4% as of 11/04/2024. He does not monitor his BG at this time. He is not taking any medications to manage his blood sugar other than the Mounjaro 15 mg/0.5 mL weekly injection, which is primarily used for weight control. Patient's current weight reported at 248 lbs, which is an 18 lb loss since starting therapy plan. He appears to have reached a plateau of weight loss. Mounjaro 15 mg will be continued and patient was counseled to be mindful of diet, portion sizes, and carbohydrate intake.     HPI  WEIGHT LOSS  BMI Readings from Last 3 Encounters:   11/04/24 38.54 kg/m²   08/06/24 40.35 kg/m²   05/28/24 43.22 kg/m²      Starting weight: 265 lbs (A few years ago)   Last visit weight: 248 lbs  Current weight: 249 lbs    Lifestyle  Diet: 2-3 meals/day. Skips lunch   BK: yogurt, eggs sometimes cheese and oatmeal   LN: sometimes skips, chicken salad spread, burger (turkey)   DN: protein (chicken or turkey)  salad, usually eats at home   Snacks: varies if home or not, usually fruits/ cheese   Drinks: crystal light, water, diet pops occasionally   Has pastas and rice few times a week   Has worked with nutritionist/dietician? Yes, years ago, does not think was helpful   Physical Activity: 3-5 days per week, riding bike, PT weekly ~1 hour    Non-Pharmacological Therapy  Weight loss techniques attempted:  Self-directed dieting: little results    Commercial weight loss program: little results     Pharmacological Therapy  Current Medications: mounjaro 15 mg weekly since October 2023  Adverse Effects: no issues, small injection site reaction   Previous Medications: Was on trulicity, no results      Insurance coverage of weight-loss medications? Yes   Eligible for copay cards/programs? No, Medicare       DISCUSSION/NOTES:  Mr. Velasquez is doing well at today's visit. He reports no changes in diet/exercise and confirms he has reached a weight loss plateau with the Mounjaro 15 mg. Various approaches were discussed at today's visit, including maintaining the Mounjaro 15mg dose, or switching to Ozempic. However, the majority of weight loss is seen with his current therapy, and there is a previous failed attempt Trulicity (no benefit). His PCP Dr. Huynh was consulted and also believes there may not be much benefit switching to Ozempic.       Objective   Allergies   Allergen Reactions    Methocarbamol Hives    Methylisothiazolinone Unknown    Primidone Other     Fatigue     Social History     Social History Narrative    Not on file      Medication Review  Current Outpatient Medications   Medication Instructions    amantadine (Symmetrel) 100 mg tablet 1 tablet, 3 times daily    apple cider vinegar 500 mg, oral, 3 times daily    betamethasone dipropionate 0.05 % cream 1 Application    cholecalciferol (Vitamin D-3) 5,000 Units tablet 1 tablet, Daily    chromium picolinate 200 mcg tablet tablet 1 tablet, 3 times daily    clindamycin (Cleocin T) 1 % lotion 1 Application    coenzyme Q-10 100 mg, Daily    cyclobenzaprine (FLEXERIL) 5 mg, oral, 2 times daily PRN    gabapentin (Neurontin) 300 mg capsule Take 4 tabs nightly    hydrocortisone 2.5 % cream 1 Application    melatonin 15 mg, Nightly PRN    metroNIDAZOLE (Metrogel) 1 % gel 1 Application    Mounjaro 15 mg, subcutaneous, Every 7 days    multivit-iron-minerals-folic acid (Centrum Silver) 0.4 mg-300 mcg- 250 mcg  tab 1 tablet, Daily    Myrbetriq 50 mg, Daily    nicotinamide riboside chloride (NICOTINAMIDE RIBOSIDE, BULK, MISC) 300 mg, Daily    polyethylene glycol (GLYCOLAX, MIRALAX) 17 g, Daily RT    pramipexole (Mirapex) 0.125 mg tablet 1 tablet, 3 times daily    pramipexole (Mirapex) 0.5 mg tablet 1 tablet, 3 times daily    pravastatin (PRAVACHOL) 40 mg, oral, Daily    saw palmetto 1,350 mg, oral, 3 times daily    tadalafil (CIALIS) 5 mg, Daily PRN    trihexyphenidyl (ARTANE) 1 mg, oral, 3 times daily    zolpidem (AMBIEN) 10 mg, Nightly PRN      Vitals  BP Readings from Last 2 Encounters:   11/04/24 110/66   08/06/24 129/85     BMI Readings from Last 1 Encounters:   11/04/24 38.54 kg/m²      Labs  A1C  Lab Results   Component Value Date    HGBA1C 5.4 11/04/2024    HGBA1C 5.7 05/28/2024    HGBA1C 6.2 02/26/2024     BMP  Lab Results   Component Value Date    CALCIUM 9.6 11/04/2024     11/04/2024    K 4.3 11/04/2024    CO2 29 11/04/2024     11/04/2024    BUN 20 11/04/2024    CREATININE 1.39 (H) 11/04/2024    EGFR 57 (L) 11/04/2024     LFTs  Lab Results   Component Value Date    ALT 32 11/04/2024    AST 25 11/04/2024    ALKPHOS 66 11/04/2024    BILITOT 1.0 11/04/2024     FLP  Lab Results   Component Value Date    TRIG 174 (H) 08/08/2024    CHOL 163 08/08/2024    LDLF 100 (H) 10/04/2022    LDLCALC 85 08/08/2024    HDL 43.1 08/08/2024     Urine Microalbumin  Lab Results   Component Value Date    MICROALBCREA 11.3 10/04/2022     Weight Management  Wt Readings from Last 3 Encounters:   11/04/24 108 kg (238 lb 12.8 oz)   08/06/24 113 kg (250 lb)   05/28/24 114 kg (251 lb 12.8 oz)      There is no height or weight on file to calculate BMI.     Assessment/Plan   Problem List Items Addressed This Visit       Diabetes mellitus, type 2 (Multi)     Well controlled with A1C 5.4% as of 11/04/2024. Will continue to monitor.          Relevant Orders    Referral to Clinical Pharmacy    Morbid obesity (Multi)     Patient appears to  have reached a weight loss plateau with Mounjaro 15 mg. Due to previous trial of Trulicity, there is likely not much benefit to be seen from switching GLP's. He does not report any adverse effects of the medication, so therapy is safe to continue at this time. Will reassess after he establishes care with his new PCP Dr. Cooper.          Relevant Orders    Referral to Clinical Pharmacy       PLAN:  CONTINUE  Mounjaro 15 mg/0.5 mL weekly injection   Mindful diet and exercise habits     Clinical Pharmacist follow-up: 01/29/2025 @12PM , Telehealth visit    Continue all meds under the continuation of care with the referring provider and clinical pharmacy team.    Thank you,  Maryann Abraham, PharmD   Meds PGY-1 Resident   [phone]    Verbal consent to manage patient's drug therapy was obtained from the patient. They were informed they may decline to participate or withdraw from participation in pharmacy services at any time.

## 2024-12-17 ENCOUNTER — APPOINTMENT (OUTPATIENT)
Dept: RHEUMATOLOGY | Facility: CLINIC | Age: 64
End: 2024-12-17
Payer: MEDICARE

## 2024-12-17 DIAGNOSIS — I73.00 RAYNAUD'S DISEASE WITHOUT GANGRENE: Primary | ICD-10-CM

## 2024-12-17 DIAGNOSIS — E66.01 MORBID OBESITY (MULTI): ICD-10-CM

## 2024-12-17 DIAGNOSIS — M54.16 LUMBAR RADICULOPATHY, CHRONIC: ICD-10-CM

## 2024-12-17 PROCEDURE — 3048F LDL-C <100 MG/DL: CPT | Performed by: STUDENT IN AN ORGANIZED HEALTH CARE EDUCATION/TRAINING PROGRAM

## 2024-12-17 PROCEDURE — 99214 OFFICE O/P EST MOD 30 MIN: CPT | Performed by: STUDENT IN AN ORGANIZED HEALTH CARE EDUCATION/TRAINING PROGRAM

## 2024-12-17 RX ORDER — GABAPENTIN 300 MG/1
CAPSULE ORAL
Qty: 360 CAPSULE | Refills: 3 | Status: SHIPPED | OUTPATIENT
Start: 2024-12-17

## 2024-12-17 NOTE — PROGRESS NOTES
Subjective   Patient ID: Steven Velasquez is a 64 y.o. male who presents for Follow-up (Raynaud's ).  HPI:   white male with Parkinson's, history of lumbosacral radiculopathy status post surgery, raynadus sxs but without color changes, possible history of gout, here for gabapentin refill.  He is on a stable dose of gabapentin; 4 tabs at night    Not sure if current dose of gabapentin does  help; his main symptom is right great toe numbness that he has had since his back surgery around 2021, .  Worse at night but does not wake him up             Objective   There were no vitals taken for this visit.      Physical Exam  Constitutional: Alert and in no acute distress. Well developed, well nourished     Lab Results   Component Value Date    WBC 6.8 11/04/2024    HGB 16.8 11/04/2024    HCT 50.6 11/04/2024     11/04/2024    ALT 32 11/04/2024    AST 25 11/04/2024    CREATININE 1.39 (H) 11/04/2024          Lab Results   Component Value Date    SEDRATE 5 04/26/2021    CRP 0.69 04/26/2021   \\            There is currently no information documented on the homunculus. Go to the Rheumatology activity and complete the homunculus joint exam.      ECG 12 lead  Normal sinus rhythm  Inferior infarct , age undetermined  Abnormal ECG  No previous ECGs available  Confirmed by Leon Krishnan ([id number]) on 8/14/2024 10:06:08 AM          Assessment/Plan:       #Lumbosacral radiculopathy  -Mostly resolved status post surgery around 2021 but still has some chronic numbness inright greattoe  Gabapentin helps somewhat;  he take his 4 tabs at gabapentin at night - 1 yr refill 12/2024   -right great toe numbness is chronic since 2021; spine surgeon Dr Tavera is aware, it is not getting worse so I do not think an EMG is needed at this time  -Patient states he will let me know when/where he wants gabapentin refills sent  -Counseled patient that he should monitor renal function with PCP while on gabapentin     #Raynauds sxs but no color  changes  -keep hands warm  - will let me know if he needs meds    #Obesity  -Discussed weight loss, patient is working on it    Patient counseled to seek medical care if any new or worsening symptoms, urgently if needed.      Note will be sent to primary care doctor.    Return to clinic 6-12 , sooner if needed    Dragon dictation software was used to dictate this note. Errors may have occurred during dictation that was not intended by the user.        [id number] based off of 2 or more chronic illnesses and prescription drug management

## 2025-01-25 NOTE — PROGRESS NOTES
Subjective   Patient ID: Steven Velasquez is a 64 y.o. male who presents to establish PCP    HPI   The patient reports a history of morning frequency times a few years.  He reports a history of urinary urgency and postvoid dribbling times approximately 2 years.  He reports no other associated symptoms.  He reports that he has noted far less urgency with use of Myrbetriq.    Over the past several years, the patient reports a history of constipation.  He reports no other associated symptoms.    The patient does report that he did test positive for COVID-19 December 28, 2024.  He reports that the illness consisted of sore throat and nasal congestion which lasted a few days.  The patient reports no fever, cough, shortness of breath, chest pain, abdominal pain, nausea, vomiting, diarrhea, melena, hematochezia, dysuria, hesitancy, weak stream, interruption of urine stream.  Review of Systems    Objective   There were no vitals taken for this visit.    Physical Exam  Lungs-clear  Cardiac-tachycardic, rhythm regular, positive S4 noted, no murmurs, no JVD  Abdomen-soft,obese.  There is a 15 x 15 cm mass in the epigastrium.  Palpation revealed no tenderness or increase in warmth.  No tenderness or masses noted throughout the rest of the abdomen  Extremities-no peripheral edema  Neurologic  Masked facies noted.  Hypophonia noted.  Resting tremor noted in both upper extremities  Assessment/Plan   Problem List Items Addressed This Visit             ICD-10-CM    Diabetes mellitus, type 2 (Multi) - Primary E11.9    Pure hypercholesterolemia E78.00    Parkinson's disease (Multi) G20.A1    Gout M10.9        Assessment  COVID-19 December 28, 2024  Colonic polyps  Morning frequency, urinary urgency, postvoid dribbling-probably secondary to neurogenic bladder secondary to Parkinson's disease  Raynaud's phenomenon  Prediabetes  Hypercholesterolemia  Stasis dermatitis  Bilateral venous insufficiency-status post ablation and laser  treatment on both lower extremities and September October 2024  Parkinson's disease  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  I did tell the patient that I do not prescribe controlled substances so that I would not be able to refill his Ambien or gabapentin.  I would be able to refill all of his other medications.  The patient will return for a complete physical examination in November 2025

## 2025-01-27 ENCOUNTER — APPOINTMENT (OUTPATIENT)
Dept: PRIMARY CARE | Facility: CLINIC | Age: 65
End: 2025-01-27
Payer: MEDICARE

## 2025-01-27 VITALS
BODY MASS INDEX: 39.28 KG/M2 | TEMPERATURE: 97 F | DIASTOLIC BLOOD PRESSURE: 74 MMHG | WEIGHT: 244.4 LBS | HEART RATE: 112 BPM | HEIGHT: 66 IN | SYSTOLIC BLOOD PRESSURE: 110 MMHG

## 2025-01-27 DIAGNOSIS — E11.9 TYPE 2 DIABETES MELLITUS WITHOUT COMPLICATION, WITHOUT LONG-TERM CURRENT USE OF INSULIN (MULTI): Primary | ICD-10-CM

## 2025-01-27 DIAGNOSIS — M10.9 GOUT, UNSPECIFIED CAUSE, UNSPECIFIED CHRONICITY, UNSPECIFIED SITE: ICD-10-CM

## 2025-01-27 DIAGNOSIS — E78.00 PURE HYPERCHOLESTEROLEMIA: ICD-10-CM

## 2025-01-27 DIAGNOSIS — G20.A1 PARKINSON'S DISEASE WITHOUT DYSKINESIA OR FLUCTUATING MANIFESTATIONS: ICD-10-CM

## 2025-01-27 PROCEDURE — 3074F SYST BP LT 130 MM HG: CPT | Performed by: INTERNAL MEDICINE

## 2025-01-27 PROCEDURE — 99213 OFFICE O/P EST LOW 20 MIN: CPT | Performed by: INTERNAL MEDICINE

## 2025-01-27 PROCEDURE — 3008F BODY MASS INDEX DOCD: CPT | Performed by: INTERNAL MEDICINE

## 2025-01-27 PROCEDURE — 3078F DIAST BP <80 MM HG: CPT | Performed by: INTERNAL MEDICINE

## 2025-01-29 ENCOUNTER — APPOINTMENT (OUTPATIENT)
Dept: PHARMACY | Facility: HOSPITAL | Age: 65
End: 2025-01-29
Payer: MEDICARE

## 2025-01-29 DIAGNOSIS — E66.01 MORBID OBESITY (MULTI): ICD-10-CM

## 2025-01-29 DIAGNOSIS — E11.9 TYPE 2 DIABETES MELLITUS WITHOUT COMPLICATION, WITHOUT LONG-TERM CURRENT USE OF INSULIN (MULTI): ICD-10-CM

## 2025-01-29 NOTE — ASSESSMENT & PLAN NOTE
- Continue Mounjaro 15 mg weekly injection   - Continue mindful diet and exercise regimen   - Monitor for any SE's as discussed

## 2025-01-29 NOTE — PROGRESS NOTES
Clinical Pharmacy Appointment    Patient ID: Steven Velasquez is a 64 y.o. male who presents for weight management.    Pt is here for Follow Up appointment. Last appointment was completed on 12/04/24    Referring Provider: Giorgio Huynh MD  PCP: Andres Cooper  Last visit with PCP: 1/27/2025 (MEREDITH Cooper)  Next visit with PCP: 11/10/25    Subjective   Review of Past Appointment:  Patient doing well at visit. His A1C remains well controlled at 5.4% as of 11/04/2024, does not monitor sugars and no medications for BG other than Mounjaro 15mg, used primarily for weight loss. He reports no changes in diet/exercise and confirms he has reached a weight loss plateau with the Mounjaro 15 mg. Various approaches were discussed at today's visit, including maintaining the Mounjaro 15mg dose, or switching to Ozempic. However, the majority of weight loss is seen with his current therapy, and there is a previous failed attempt Trulicity (no benefit). His PCP Dr. Huynh was consulted and also believes there may not be much benefit switching to Ozempic. Mounjaro 15 continued at this time       PMH  T2DM  HLD  Obesity   Parkinsons      HPI  WEIGHT LOSS  BMI Readings from Last 3 Encounters:   01/27/25 39.45 kg/m²   11/04/24 38.54 kg/m²   08/06/24 40.35 kg/m²      Starting weight: 265 lbs (A few years ago)   Last visit weight: 249 lbs 12/04  Current weight: 246 lbs 01/29    Lifestyle  Diet: 2-3 meals/day. Skips lunch   BK: yogurt, eggs sometimes cheese and oatmeal   LN: sometimes skips, chicken salad spread, burger (turkey)   DN: protein (chicken or turkey)  salad, usually eats at home   Snacks: varies if home or not, usually fruits/ cheese   Drinks: crystal light, water, diet pops occasionally   Has pastas and rice few times a week   Has worked with nutritionist/dietician? Yes, years ago, does not think was helpful   Physical Activity: 3-5 days per week, riding bike, PT weekly ~1 hour    Non-Pharmacological Therapy  Weight loss  techniques attempted:  Self-directed dieting: little results   Commercial weight loss program: little results     Pharmacological Therapy  Current Medications: Mounjaro 15 mg weekly since October 2023  Adverse Effects: no issues, small injection site reaction ~size of quarter. Several days, PCP notified and okay    Previous Medications: Was on trulicity, no results      Insurance coverage of weight-loss medications? Yes   Eligible for copay cards/programs? No, Medicare         Objective   Allergies   Allergen Reactions    Methocarbamol Hives    Methylisothiazolinone Unknown    Primidone Other     Fatigue     Social History     Social History Narrative    Not on file      Medication Review  Current Outpatient Medications   Medication Instructions    amantadine (Symmetrel) 100 mg tablet 1 tablet, 3 times daily    apple cider vinegar 500 mg, oral, 3 times daily    betamethasone dipropionate 0.05 % cream 1 Application    cholecalciferol (Vitamin D-3) 5,000 Units tablet 1 tablet, Daily    chromium picolinate 200 mcg tablet tablet 1 tablet, 3 times daily    clindamycin (Cleocin T) 1 % lotion 1 Application    coenzyme Q-10 100 mg, Daily    cyclobenzaprine (FLEXERIL) 5 mg, oral, 2 times daily PRN    gabapentin (Neurontin) 300 mg capsule Take 4 tabs nightly    hydrocortisone 2.5 % cream 1 Application    melatonin 15 mg, Nightly PRN    metroNIDAZOLE (Metrogel) 1 % gel 1 Application    Mounjaro 15 mg, subcutaneous, Every 7 days    multivit-iron-minerals-folic acid (Centrum Silver) 0.4 mg-300 mcg- 250 mcg tab 1 tablet, Daily    Myrbetriq 50 mg, Daily    nicotinamide riboside chloride (NICOTINAMIDE RIBOSIDE, BULK, MISC) 300 mg, Daily    polyethylene glycol (GLYCOLAX, MIRALAX) 17 g, Daily RT    pramipexole (Mirapex) 0.125 mg tablet 1 tablet, 3 times daily    pramipexole (Mirapex) 0.5 mg tablet 1 tablet, 3 times daily    pravastatin (PRAVACHOL) 40 mg, oral, Daily    saw palmetto 1,350 mg, oral, 3 times daily    tadalafil (CIALIS)  5 mg, Daily PRN    trihexyphenidyl (ARTANE) 1 mg, oral, 3 times daily    zolpidem (AMBIEN) 10 mg, Nightly PRN      Vitals  BP Readings from Last 2 Encounters:   01/27/25 110/74   11/04/24 110/66     BMI Readings from Last 1 Encounters:   01/27/25 39.45 kg/m²      Labs  A1C  Lab Results   Component Value Date    HGBA1C 5.4 11/04/2024    HGBA1C 5.7 05/28/2024    HGBA1C 6.2 02/26/2024     BMP  Lab Results   Component Value Date    CALCIUM 9.6 11/04/2024     11/04/2024    K 4.3 11/04/2024    CO2 29 11/04/2024     11/04/2024    BUN 20 11/04/2024    CREATININE 1.39 (H) 11/04/2024    EGFR 57 (L) 11/04/2024     LFTs  Lab Results   Component Value Date    ALT 32 11/04/2024    AST 25 11/04/2024    ALKPHOS 66 11/04/2024    BILITOT 1.0 11/04/2024     FLP  Lab Results   Component Value Date    TRIG 174 (H) 08/08/2024    CHOL 163 08/08/2024    LDLF 100 (H) 10/04/2022    LDLCALC 85 08/08/2024    HDL 43.1 08/08/2024     Urine Microalbumin  Lab Results   Component Value Date    MICROALBCREA 11.3 10/04/2022     Weight Management  Wt Readings from Last 3 Encounters:   01/27/25 111 kg (244 lb 6.4 oz)   11/04/24 108 kg (238 lb 12.8 oz)   08/06/24 113 kg (250 lb)      There is no height or weight on file to calculate BMI.       Assessment/Plan     DISCUSSION/NOTES:   Patient reports compliance to Mounjaro 15 mg at this time with no changes to diet/exercise. He does endorse an injection site reaction such as a red [place] ~size of a quarter after the injection which resolves after a few days. He states he showed it to her PCP Dr. Ahn, who is not concerned about it's appearance at this time. He also reports trouble sleeping at night and getting up before a full nights sleep which he contributes to his parkinson's.   Regarding weight loss, patient has seen slight addition weight reduction (~3 lbs loss) since last visit. He feels comfortable with the medication with no other SE's and would like to continue at this time rather  than tapering off of it. Emphasis on diet and exercise when possible was explained, patient agrees and voices understanding. No refills needed at this time.     Problem List Items Addressed This Visit       Diabetes mellitus, type 2 (Multi)    Morbid obesity (Multi)     - Continue Mounjaro 15 mg weekly injection   - Continue mindful diet and exercise regimen   - Monitor for any SE's as discussed               PLAN:  CONTINUE  Mounjaro 15 mg/0.5 mL weekly injection   Mindful diet and exercise habits     Clinical Pharmacist follow-up: 03/26/2025 @11:20PM , Telehealth visit    Continue all meds under the continuation of care with the referring provider and clinical pharmacy team.    Thank you,  Maryann Abraham, PharmD   Meds PGY-1 Resident   [phone]    Verbal consent to manage patient's drug therapy was obtained from the patient. They were informed they may decline to participate or withdraw from participation in pharmacy services at any time.

## 2025-01-29 NOTE — PROGRESS NOTES
I reviewed the progress note and agree with the resident’s findings and plans as written. Case discussed with resident.    Vanna Subramanian, PharmD  Clinical Pharmacy Specialist   [phone]

## 2025-02-06 DIAGNOSIS — E11.9 TYPE 2 DIABETES MELLITUS WITHOUT COMPLICATION, WITHOUT LONG-TERM CURRENT USE OF INSULIN (MULTI): Primary | ICD-10-CM

## 2025-02-11 ENCOUNTER — APPOINTMENT (OUTPATIENT)
Dept: CARDIOLOGY | Facility: CLINIC | Age: 65
End: 2025-02-11
Payer: MEDICARE

## 2025-02-11 LAB
ALBUMIN/CREAT UR: 3 MG/G CREAT
CREAT UR-MCNC: 120 MG/DL (ref 20–320)
MICROALBUMIN UR-MCNC: 0.4 MG/DL

## 2025-02-14 NOTE — PROGRESS NOTES
Initially referred by Dr. [facility] for PAD workup.   Chief Complaint: edema in LE not related to PAD       History of Present Illness  Steven Velasquez is a 64 y.o. year old male patient with h/o Diabetes mellitus, type 2 , Parkinson's disease, and morbid obesity.  Was referred to us last year for a second opinion due to changes in his skin of both lower extremities from mid shin down to the ankle. Per patient he was seen by Dr. [facility], which ordered a Venous US that confirmed bilateral venous insufficiency, negative for DVT. Patient had a RLE and LLE ablation done at the end of August with no improvement in swelling. On the exam patient complained of bilateral swelling, discoloration at the ankle site. Denied claudication on ambulation or rest pain. Vascular testing performed on last visit with no evidence of arterial occlusive disease BL in LE. Recommended cardiac work up: ECHO< EKG, CA score test, blood work, on last visit. Reviewed the EKG-NSR, blood wok LDL 85, Cholesterol 43.1, triglycerides 174, CRP 38. Patient is on Pravastatin 40 mg. CA score test negative (zero score).       Past Medical History  Past Medical History:   Diagnosis Date    Anaphylactic shock, unspecified, subsequent encounter 05/10/2018    Anaphylaxis, subsequent encounter    Elevated blood-pressure reading, without diagnosis of hypertension 06/24/2020    Elevated blood pressure reading without diagnosis of hypertension    Personal history of diseases of the blood and blood-forming organs and certain disorders involving the immune mechanism     History of polycythemia    Personal history of other specified conditions 11/13/2015    History of epigastric pain       Past Surgical History  Past Surgical History:   Procedure Laterality Date    OTHER SURGICAL HISTORY  09/19/2016    Tenotomy Lateral Epicondyle Elbow Open With Tendon Reattachment    OTHER SURGICAL HISTORY  09/19/2016    Palatopharyngoplasty    OTHER  SURGICAL HISTORY  10/06/2021    Transforaminal lumbar interbody fusion    OTHER SURGICAL HISTORY  10/06/2021    Lumbar laminectomy       Social History  Social History     Tobacco Use    Smoking status: Former     Current packs/day: 0.00     Types: Cigarettes     Quit date:      Years since quittin.1     Passive exposure: Past    Smokeless tobacco: Never   Vaping Use    Vaping status: Never Used   Substance Use Topics    Alcohol use: Not Currently    Drug use: Never       Family History     Family History   Problem Relation Name Age of Onset    Osteoarthritis Mother      Osteoporosis Mother      Lung cancer Father      Parkinsonism Mother's Sister         Review of Systems  As per HPI, all other systems reviewed and negative.    Allergies:  Allergies   Allergen Reactions    Methocarbamol Hives    Methylisothiazolinone Unknown    Primidone Other     Fatigue        Outpatient Medications:  Current Outpatient Medications   Medication Instructions    amantadine (Symmetrel) 100 mg tablet 1 tablet, 3 times daily    apple cider vinegar 500 mg, oral, 3 times daily    betamethasone dipropionate 0.05 % cream 1 Application    cholecalciferol (Vitamin D-3) 5,000 Units tablet 1 tablet, Daily    chromium picolinate 200 mcg tablet tablet 1 tablet, 3 times daily    clindamycin (Cleocin T) 1 % lotion 1 Application    coenzyme Q-10 100 mg, Daily    cyclobenzaprine (FLEXERIL) 5 mg, oral, 2 times daily PRN    gabapentin (Neurontin) 300 mg capsule Take 4 tabs nightly    hydrocortisone 2.5 % cream 1 Application    melatonin 15 mg, Nightly PRN    metroNIDAZOLE (Metrogel) 1 % gel 1 Application    Mounjaro 15 mg, subcutaneous, Every 7 days    multivit-iron-minerals-folic acid (Centrum Silver) 0.4 mg-300 mcg- 250 mcg tab 1 tablet, Daily    Myrbetriq 50 mg, Daily    nicotinamide riboside chloride (NICOTINAMIDE RIBOSIDE, BULK, MISC) 300 mg, Daily    polyethylene glycol (GLYCOLAX, MIRALAX) 17 g, Daily RT    pramipexole (Mirapex) 0.125  mg tablet 1 tablet, 3 times daily    pramipexole (Mirapex) 0.5 mg tablet 1 tablet, 3 times daily    pravastatin (PRAVACHOL) 40 mg, oral, Daily    saw palmetto 1,350 mg, oral, 3 times daily    tadalafil (CIALIS) 5 mg, Daily PRN    trihexyphenidyl (ARTANE) 1 mg, oral, 3 times daily    zolpidem (AMBIEN) 10 mg, Nightly PRN         Vitals:  Vitals:    02/18/25 1328   BP: 116/75   Pulse: 88       Physical Exam:   Constitutional:       Appearance: Normal appearance.   Pulmonary:      Effort: Pulmonary effort is normal.   Musculoskeletal:         General: Normal range of motion.      Right lower leg: Edema present.      Left lower leg: Edema present.   Skin:     General: Skin is warm.      Comments: Shin discoloration bilaterally    Neurological:      General: No focal deficit present.      Mental Status: He is alert and oriented to person, place, and time.     Reviewed Study(s):  CT cardiac scoring wo IV contrast :    The visualized mid/lower ascending thoracic aorta measures 3.8 cm in  diameter. The heart is normal in size. No pericardial effusion is  present.      No gross evidence of mediastinal or hilar lymphadenopathy or masses  is identified. The visualized segments of the lungs are normally  expanded.      The visualized subdiaphragmatic structures appear intact.      IMPRESSION:  1. Coronary artery calcium score of 0*.    Cardiac Studies  Echo: No results found for this or any previous visit.  Angiogram:     Vascular Studies   ECG 12 lead  Normal sinus rhythm  Inferior infarct , age undetermined  Abnormal ECG  No previous ECGs available  Confirmed by Leon Krishnan ([id number]) on 8/14/2024 10:06:08 AM       Assessment/Plan   Steven Velasquez is a 64 y.o. year old male patient with h/o Diabetes mellitus, Sleep Apnea on CPAP, type 2 , Parkinson's disease, and morbid obesity.  Was referred to us last year for a second opinion due to changes in his skin of both lower extremities from mid shin down to the ankle. Per patient  he was seen by Dr. [facility], which ordered a Venous US that confirmed bilateral venous insufficiency, negative for DVT. Patient had a RLE ablation done and left LLE scheduled for the end of August. On the exam patient complained of bilateral swelling, discoloration at the ankle site. Denied claudication on ambulation or rest pain. Vascular testing was performed with no evidence of arterial occlusive disease BL in LE. Recommended cardiac work up: ECHO< EKG, CA score test, blood work, and follow up in 6 mo.   EKG-NSR  Labs: LDL 85, TGs 174, CRP 3.8, A1C 5.8, TSH 3.88,   No ECHO on file    Plan:  -negative for arterial disease based on the previous testing  - Obtain ECHO, was not ordered with previous visit  - Continue with compression socks and elevation of bilateral lower extremity  - we can refer you to Dr Last for venous disease  - will call the pt with ECHO results, no follow up with our office  needed    Patrice Mcbride DO

## 2025-02-18 ENCOUNTER — APPOINTMENT (OUTPATIENT)
Dept: CARDIOLOGY | Facility: CLINIC | Age: 65
End: 2025-02-18
Payer: MEDICARE

## 2025-02-18 VITALS
WEIGHT: 253 LBS | DIASTOLIC BLOOD PRESSURE: 75 MMHG | BODY MASS INDEX: 40.84 KG/M2 | HEART RATE: 88 BPM | SYSTOLIC BLOOD PRESSURE: 116 MMHG

## 2025-02-18 DIAGNOSIS — M79.89 SWELLING OF BOTH LOWER EXTREMITIES: Primary | ICD-10-CM

## 2025-02-18 DIAGNOSIS — R60.1 GENERALIZED EDEMA: ICD-10-CM

## 2025-02-18 DIAGNOSIS — I87.2 VENOUS (PERIPHERAL) INSUFFICIENCY: ICD-10-CM

## 2025-02-18 PROCEDURE — 99215 OFFICE O/P EST HI 40 MIN: CPT | Performed by: INTERNAL MEDICINE

## 2025-02-18 PROCEDURE — 3078F DIAST BP <80 MM HG: CPT | Performed by: INTERNAL MEDICINE

## 2025-02-18 PROCEDURE — 3074F SYST BP LT 130 MM HG: CPT | Performed by: INTERNAL MEDICINE

## 2025-02-18 NOTE — PATIENT INSTRUCTIONS
Thank you for coming to see us in the [facility] today.     - We have reviewed the results of the EKG , and cardiac Calcium score   - Please continue taking all your medications, walking/exercise, and foot protection. Work with your podiatrist to off load the feet and toes.  - Continue with compression socks and elevation of bilateral lower extremity  -will refer you to Dr Last for venous insufficiency  -we ordered an ECHO, will review the results and give you a call back   - If you start experiencing pain at rest, severe pain that is different from baseline, or develop any new open wounds of your lower legs please call and schedule an appointment sooner.

## 2025-02-25 ENCOUNTER — APPOINTMENT (OUTPATIENT)
Dept: CARDIOLOGY | Facility: CLINIC | Age: 65
End: 2025-02-25
Payer: MEDICARE

## 2025-03-04 ENCOUNTER — HOSPITAL ENCOUNTER (OUTPATIENT)
Dept: CARDIOLOGY | Facility: HOSPITAL | Age: 65
Discharge: HOME | End: 2025-03-04
Payer: MEDICARE

## 2025-03-04 DIAGNOSIS — R60.1 GENERALIZED EDEMA: ICD-10-CM

## 2025-03-04 DIAGNOSIS — M79.89 SWELLING OF BOTH LOWER EXTREMITIES: ICD-10-CM

## 2025-03-04 PROCEDURE — 93306 TTE W/DOPPLER COMPLETE: CPT | Performed by: INTERNAL MEDICINE

## 2025-03-04 PROCEDURE — 93306 TTE W/DOPPLER COMPLETE: CPT

## 2025-03-05 LAB
AORTIC VALVE MEAN GRADIENT: 3 MMHG
AORTIC VALVE PEAK VELOCITY: 1.27 M/S
AV PEAK GRADIENT: 6 MMHG
AVA (PEAK VEL): 2.77 CM2
AVA (VTI): 3 CM2
EJECTION FRACTION APICAL 4 CHAMBER: 68.8
EJECTION FRACTION: 66 %
LEFT ATRIUM VOLUME AREA LENGTH INDEX BSA: 14.4 ML/M2
LEFT VENTRICLE INTERNAL DIMENSION DIASTOLE: 4.08 CM (ref 3.5–6)
LEFT VENTRICULAR OUTFLOW TRACT DIAMETER: 2.01 CM
MITRAL VALVE E/A RATIO: 0.63
RIGHT VENTRICLE FREE WALL PEAK S': 13 CM/S
TRICUSPID ANNULAR PLANE SYSTOLIC EXCURSION: 2.5 CM

## 2025-03-06 ENCOUNTER — TELEPHONE (OUTPATIENT)
Dept: CARDIOLOGY | Facility: HOSPITAL | Age: 65
End: 2025-03-06
Payer: MEDICARE

## 2025-03-06 NOTE — TELEPHONE ENCOUNTER
Discussed with patient his recent results of an ECHO. No concerns or abnormalities.  Patient should follow up with Dr Last for venous insufficiency/leg edema.    RAFI Christine-CNP

## 2025-03-21 ENCOUNTER — OFFICE VISIT (OUTPATIENT)
Dept: URGENT CARE | Age: 65
End: 2025-03-21
Payer: MEDICARE

## 2025-03-21 ENCOUNTER — ANCILLARY PROCEDURE (OUTPATIENT)
Dept: URGENT CARE | Age: 65
End: 2025-03-21
Payer: MEDICARE

## 2025-03-21 VITALS
WEIGHT: 255 LBS | OXYGEN SATURATION: 96 % | TEMPERATURE: 97.5 F | RESPIRATION RATE: 19 BRPM | DIASTOLIC BLOOD PRESSURE: 76 MMHG | HEIGHT: 72 IN | SYSTOLIC BLOOD PRESSURE: 135 MMHG | HEART RATE: 102 BPM | BODY MASS INDEX: 34.54 KG/M2

## 2025-03-21 DIAGNOSIS — R05.1 ACUTE COUGH: ICD-10-CM

## 2025-03-21 DIAGNOSIS — J40 BRONCHITIS: Primary | ICD-10-CM

## 2025-03-21 PROBLEM — J18.9 PNEUMONIA DUE TO INFECTIOUS ORGANISM: Status: ACTIVE | Noted: 2025-03-21

## 2025-03-21 LAB
POC CORONAVIRUS SARS-COV-2 PCR: NEGATIVE
POC HUMAN RHINOVIRUS PCR: NEGATIVE
POC INFLUENZA A VIRUS PCR: NEGATIVE
POC INFLUENZA B VIRUS PCR: NEGATIVE
POC RESPIRATORY SYNCYTIAL VIRUS PCR: NEGATIVE

## 2025-03-21 PROCEDURE — 71046 X-RAY EXAM CHEST 2 VIEWS: CPT | Performed by: STUDENT IN AN ORGANIZED HEALTH CARE EDUCATION/TRAINING PROGRAM

## 2025-03-21 RX ORDER — ALBUTEROL SULFATE 90 UG/1
2 INHALANT RESPIRATORY (INHALATION) EVERY 4 HOURS PRN
Qty: 6.7 G | Refills: 0 | Status: SHIPPED | OUTPATIENT
Start: 2025-03-21 | End: 2026-03-21

## 2025-03-21 RX ORDER — AMOXICILLIN AND CLAVULANATE POTASSIUM 875; 125 MG/1; MG/1
1 TABLET, FILM COATED ORAL 2 TIMES DAILY
Qty: 10 TABLET | Refills: 0 | Status: SHIPPED | OUTPATIENT
Start: 2025-03-21 | End: 2025-03-21

## 2025-03-21 RX ORDER — DOXYCYCLINE 100 MG/1
100 CAPSULE ORAL 2 TIMES DAILY
Qty: 10 CAPSULE | Refills: 0 | Status: SHIPPED | OUTPATIENT
Start: 2025-03-21 | End: 2025-03-26

## 2025-03-21 RX ORDER — BENZONATATE 200 MG/1
200 CAPSULE ORAL 3 TIMES DAILY PRN
Qty: 30 CAPSULE | Refills: 0 | Status: SHIPPED | OUTPATIENT
Start: 2025-03-21 | End: 2025-03-31

## 2025-03-21 RX ORDER — PREDNISONE 20 MG/1
40 TABLET ORAL DAILY
Qty: 10 TABLET | Refills: 0 | Status: SHIPPED | OUTPATIENT
Start: 2025-03-21 | End: 2025-03-26

## 2025-03-21 ASSESSMENT — ENCOUNTER SYMPTOMS
DIARRHEA: 1
COUGH: 1

## 2025-03-21 NOTE — PATIENT INSTRUCTIONS
Please follow up with your primary provider within 1 week. go to the emergency room, or return to urgent care if symptoms do not improve or worsen.  You may schedule an appointment online at [email or web address] or call [phone]. Go to the Emergency Department if symptoms significantly worsen or if you develop symptoms including but not limited to chest pain or shortness of breath.

## 2025-03-21 NOTE — PROGRESS NOTES
Subjective   Patient ID: Steven Velasquez is a 65 y.o. male. They present today with a chief complaint of Cough (Since Sunday) and Diarrhea (Past 3 days).    History of Present Illness  Pt presents with dry cough x 6 days, non bloody non bilious, brown colored diarrhea x 3 days. Having about 3-4 episodes of diarrhea per day. No sick contacts, travel or recent abx use. Taking otc cough medication an tessalon for sx without relief. His appetite is decreased, he is drinking and urinating well. Home covid test negative. +wheezing with coughing. States chest is feeling sore from the coughing only, no cp at rest or with exertion. He denies fever chills abd pain back pain urinary sx blood in the stool or urine nv constipation cp sob dizziness nasal congestion runny nose ear pain or sore throat. Pmhx of Parkinsons, HLD, TIIDM, obesity.       History provided by:  Patient  Cough    Diarrhea      Past Medical History  Allergies as of 03/21/2025 - Reviewed 03/21/2025   Allergen Reaction Noted    Methocarbamol Hives and Unknown 09/23/2013    Methylisothiazolinone Unknown 04/29/2024    Primidone Other 06/09/2014       (Not in a hospital admission)       Past Medical History:   Diagnosis Date    Anaphylactic shock, unspecified, subsequent encounter 05/10/2018    Anaphylaxis, subsequent encounter    Elevated blood-pressure reading, without diagnosis of hypertension 06/24/2020    Elevated blood pressure reading without diagnosis of hypertension    Personal history of diseases of the blood and blood-forming organs and certain disorders involving the immune mechanism     History of polycythemia    Personal history of other specified conditions 11/13/2015    History of epigastric pain       Past Surgical History:   Procedure Laterality Date    OTHER SURGICAL HISTORY  09/19/2016    Tenotomy Lateral Epicondyle Elbow Open With Tendon Reattachment    OTHER SURGICAL HISTORY  09/19/2016    Palatopharyngoplasty    OTHER SURGICAL HISTORY   10/06/2021    Transforaminal lumbar interbody fusion    OTHER SURGICAL HISTORY  10/06/2021    Lumbar laminectomy        reports that he quit smoking about 45 years ago. His smoking use included cigarettes. He has been exposed to tobacco smoke. He has never used smokeless tobacco. He reports that he does not currently use alcohol. He reports that he does not use drugs.    Review of Systems  Review of Systems   Respiratory:  Positive for cough.    Gastrointestinal:  Positive for diarrhea.   All other systems reviewed and are negative.                                 Objective    Vitals:    03/21/25 1429   BP: 135/76   BP Location: Left arm   Patient Position: Sitting   BP Cuff Size: Adult   Pulse: 102   Resp: 19   Temp: 36.4 °C (97.5 °F)   TempSrc: Oral   SpO2: 96%   Weight: 116 kg (255 lb)   Height: 1.829 m (6')     No LMP for male patient.    Physical Exam  Vitals and nursing note reviewed.   Constitutional:       General: He is not in acute distress.     Appearance: Normal appearance. He is not ill-appearing, toxic-appearing or diaphoretic.   HENT:      Head: Normocephalic and atraumatic.      Right Ear: Tympanic membrane, ear canal and external ear normal.      Left Ear: Tympanic membrane, ear canal and external ear normal.      Nose: Nose normal.      Mouth/Throat:      Lips: Pink.      Mouth: Mucous membranes are moist.      Dentition: Normal dentition.      Tongue: No lesions. Tongue does not deviate from midline.      Palate: No mass and lesions.      Pharynx: Oropharynx is clear. Uvula midline. No pharyngeal swelling, oropharyngeal exudate, posterior oropharyngeal erythema, uvula swelling or postnasal drip.      Tonsils: No tonsillar exudate or tonsillar abscesses.   Pulmonary:      Effort: No respiratory distress.      Breath sounds: Wheezing (right lower lobe) present. No rhonchi or rales.   Skin:     Capillary Refill: Capillary refill takes less than 2 seconds.   Neurological:      Mental Status: He is  alert.         Procedures    Point of Care Test & Imaging Results from this visit  Results for orders placed or performed in visit on 03/21/25   POCT SPOTFIRE R/ST Panel Mini w/COVID (Here@ [facility]) manually resulted    Specimen: Swab   Result Value Ref Range    POC Sars-Cov-2 PCR Negative Negative    POC Respiratory Syncytial Virus PCR Negative Negative    POC Influenza A Virus PCR Negative Negative    POC Influenza B Virus PCR Negative Negative    POC Human Rhinovirus PCR Negative Negative      XR chest 2 views    Result Date: 3/21/2025  Interpreted By:  Jaquan Albert, STUDY: XR CHEST 2 VIEWS  3/21/2025 2:57 pm   INDICATION: Signs/Symptoms:cough, wheezing   COMPARISON: None.   ACCESSION NUMBER(S): [id number]   ORDERING CLINICIAN: ERI VAUGHAN   TECHNIQUE: PA and lateral views of the chest were obtained.   FINDINGS: No focal infiltrate, pleural effusion or pneumothorax is identified. The cardiac silhouette is within normal limits for size.  Mild discogenic degenerative changes are seen throughout the thoracic spine.       No focal infiltrate or pneumothorax.   MACRO: None.   Signed by: Jaquan Albert 3/21/2025 3:14 PM Dictation workstation:   [id number]     Diagnostic study results (if any) were reviewed by Eri Vaughan PA-C.    Assessment/Plan   Allergies, medications, history, and pertinent labs/EKGs/Imaging reviewed by Eri Vaughan PA-C.         Orders and Diagnoses  Diagnoses and all orders for this visit:  Bronchitis  -     doxycycline (Vibramycin) 100 mg capsule; Take 1 capsule (100 mg) by mouth 2 times a day for 5 days. Take with at least 8 ounces (large glass) of water, do not lie down for 30 minutes after  -     benzonatate (Tessalon) 200 mg capsule; Take 1 capsule (200 mg) by mouth 3 times a day as needed for cough for up to 10 days. Do not crush or chew.  -     albuterol (Proventil HFA) 90 mcg/actuation inhaler; Inhale 2 puffs every 4 hours if needed for wheezing or shortness of breath.  -      predniSONE (Deltasone) 20 mg tablet; Take 2 tablets (40 mg) by mouth once daily for 5 days.  Acute cough  -     XR chest 2 views; Future  -     POCT SPOTFIRE R/ST Panel Mini w/COVID ([facility]) manually resulted      Medical Admin Record      Patient disposition: Home    Electronically signed by Eri Vaughan PA-C  4:14 PM

## 2025-03-26 ENCOUNTER — APPOINTMENT (OUTPATIENT)
Dept: PHARMACY | Facility: HOSPITAL | Age: 65
End: 2025-03-26
Payer: MEDICARE

## 2025-03-26 DIAGNOSIS — E11.9 TYPE 2 DIABETES MELLITUS WITHOUT COMPLICATION, WITHOUT LONG-TERM CURRENT USE OF INSULIN: ICD-10-CM

## 2025-03-26 DIAGNOSIS — E66.01 MORBID OBESITY (MULTI): ICD-10-CM

## 2025-03-26 NOTE — PROGRESS NOTES
I reviewed the progress note and agree with the resident’s findings and plans as written. Case discussed with resident.    Vanna Subramanian, PharmD  Clinical Pharmacy Specialist   [phone]

## 2025-03-26 NOTE — ASSESSMENT & PLAN NOTE
- Continue Mounjaro 15 mg weekly injection   - Continue mindful diet and exercise regimen   - Monitor for any SE's as discussed   - Care deferred back to PCP

## 2025-03-26 NOTE — PROGRESS NOTES
Clinical Pharmacy Appointment    Patient ID: Steven Velasquez is a 65 y.o. male who presents for T2DM and weight management.    Pt is here for Follow Up appointment. Last appointment was completed on 01/29/25    Referring Provider: Andres Ahn MD  PCP: Andres Cooper  Last visit with PCP: 1/27/2025 (MEREDITH Cooper)  Next visit with PCP: 11/10/25    Subjective   Review of Past Appointment:  Patient doing well at visit. His A1C remains well controlled at 5.4% as of 11/04/2024, does not monitor sugars and no medications for BG other than Mounjaro 15mg, used primarily for weight loss. Compliant and no changes to diet/exercise. Endorses injection site reaction, no systemic swelling or symptoms. PCP aware and no current concerns.   Regarding weight loss, patient has seen slight addition weight reduction (~3 lbs loss) since last visit. He feels comfortable with the medication with no other SE's and would like to continue at this time rather than tapering off of it. Emphasis on diet and exercise when possible was explained, patient agrees and voices understanding. No refills needed at this time.      PMH  T2DM  HLD  Obesity   Parkinsons      HPI  WEIGHT LOSS  BMI Readings from Last 3 Encounters:   03/21/25 34.58 kg/m²   02/18/25 40.84 kg/m²   01/27/25 39.45 kg/m²      Starting weight: 265 lbs (A few years ago)   Last visit weight: 246 lbs 01/29/25  Current weight: ~246 lbs 03/26/25    Lifestyle  Diet: 2-3 meals/day. Skips lunch   BK: yogurt, eggs sometimes cheese and oatmeal   LN: sometimes skips, chicken salad spread, burger (turkey)   DN: protein (chicken or turkey)  salad, usually eats at home   Snacks: varies if home or not, usually fruits/ cheese   Drinks: crystal light, water, diet pops occasionally   Has pastas and rice few times a week   Has worked with nutritionist/dietician? Yes, years ago, does not think was helpful   Physical Activity: 3-5 days per week, riding bike, PT weekly ~1 hour    Non-Pharmacological  Therapy  Weight loss techniques attempted:  Self-directed dieting: little results   Commercial weight loss program: little results     Pharmacological Therapy  Current Medications: Mounjaro 15 mg weekly since October 2023  Adverse Effects:   No issues, small injection site reaction ~size of quarter. Some itchiness, presents day or two after injection. Resolves on own after few days. Rotating injection site, thigh stomach, upper arm. Stomach or thigh location where reaction develops  Constipation, other meds. Miralaax   Previous Medications: Was on trulicity, no results      Insurance coverage of weight-loss medications? Yes   Eligible for copay cards/programs? No, Medicare       Objective   Allergies   Allergen Reactions    Methocarbamol Hives and Unknown    Methylisothiazolinone Unknown    Primidone Other     Fatigue     Social History     Social History Narrative    Not on file      Medication Review  Current Outpatient Medications   Medication Instructions    albuterol (Proventil HFA) 90 mcg/actuation inhaler 2 puffs, inhalation, Every 4 hours PRN    amantadine (Symmetrel) 100 mg tablet 1 tablet, 3 times daily    apple cider vinegar 500 mg, oral, 3 times daily    benzonatate (TESSALON) 200 mg, oral, 3 times daily PRN, Do not crush or chew.    betamethasone dipropionate 0.05 % cream 1 Application    cholecalciferol (Vitamin D-3) 5,000 Units tablet 1 tablet, Daily    chromium picolinate 200 mcg tablet tablet 1 tablet, 3 times daily    clindamycin (Cleocin T) 1 % lotion 1 Application    coenzyme Q-10 100 mg, Daily    cyclobenzaprine (FLEXERIL) 5 mg, oral, 2 times daily PRN    doxycycline (VIBRAMYCIN) 100 mg, oral, 2 times daily, Take with at least 8 ounces (large glass) of water, do not lie down for 30 minutes after    gabapentin (Neurontin) 300 mg capsule Take 4 tabs nightly    hydrocortisone 2.5 % cream 1 Application    melatonin 15 mg, Nightly PRN    metroNIDAZOLE (Metrogel) 1 % gel 1 Application    Mounjaro 15  mg, subcutaneous, Every 7 days    multivit-iron-minerals-folic acid (Centrum Silver) 0.4 mg-300 mcg- 250 mcg tab 1 tablet, Daily    Myrbetriq 50 mg, Daily    nicotinamide riboside chloride (NICOTINAMIDE RIBOSIDE, BULK, MISC) 300 mg, Daily    polyethylene glycol (GLYCOLAX, MIRALAX) 17 g, Daily RT    pramipexole (Mirapex) 0.125 mg tablet 1 tablet, 3 times daily    pramipexole (Mirapex) 0.5 mg tablet 1 tablet, 3 times daily    pravastatin (PRAVACHOL) 40 mg, oral, Daily    predniSONE (DELTASONE) 40 mg, oral, Daily    saw palmetto 1,350 mg, oral, 3 times daily    tadalafil (CIALIS) 5 mg, Daily PRN    trihexyphenidyl (ARTANE) 1 mg, oral, 3 times daily    zolpidem (AMBIEN) 10 mg, Nightly PRN      Vitals  BP Readings from Last 2 Encounters:   03/21/25 135/76   02/18/25 116/75     BMI Readings from Last 1 Encounters:   03/21/25 34.58 kg/m²      Labs  A1C  Lab Results   Component Value Date    HGBA1C 5.4 11/04/2024    HGBA1C 5.7 05/28/2024    HGBA1C 6.2 02/26/2024     BMP  Lab Results   Component Value Date    CALCIUM 9.6 11/04/2024     11/04/2024    K 4.3 11/04/2024    CO2 29 11/04/2024     11/04/2024    BUN 20 11/04/2024    CREATININE 1.39 (H) 11/04/2024    EGFR 57 (L) 11/04/2024     LFTs  Lab Results   Component Value Date    ALT 32 11/04/2024    AST 25 11/04/2024    ALKPHOS 66 11/04/2024    BILITOT 1.0 11/04/2024     FLP  Lab Results   Component Value Date    TRIG 174 (H) 08/08/2024    CHOL 163 08/08/2024    LDLF 100 (H) 10/04/2022    LDLCALC 85 08/08/2024    HDL 43.1 08/08/2024     Urine Microalbumin  Lab Results   Component Value Date    MICROALBCREA 3 02/10/2025     Weight Management  Wt Readings from Last 3 Encounters:   03/21/25 116 kg (255 lb)   02/18/25 115 kg (253 lb)   01/27/25 111 kg (244 lb 6.4 oz)      There is no height or weight on file to calculate BMI.       Assessment/Plan     DISCUSSION/NOTES:  Patient does not endorse any changes since last appointment. Consistent diet and exercise, no  weight loss, and injection site reaction continues to occur however is bothersome due to itchiness at most. No systemic signs of allergic reaction, has not worsened. Weight loss has reached a plateau, however counseled patient that the medication may also be preventing weight gain. Comfortable with continuing at this dose, no copayment of filling issues. Will be deferring care back to PCP at this time.       Problem List Items Addressed This Visit       Diabetes mellitus, type 2 (Multi)    Morbid obesity (Multi)     - Continue Mounjaro 15 mg weekly injection   - Continue mindful diet and exercise regimen   - Monitor for any SE's as discussed   - Care deferred back to PCP            PLAN:  CONTINUE  Mounjaro 15 mg/0.5 mL weekly injection   Mindful diet and exercise habits   Monitoring for SE's and using Miralax PRN constipation    Continue all meds under the continuation of care with the referring provider and clinical pharmacy team.    Thank you,  Maryann Abraham, PharmD   Meds PGY-1 Resident   [phone]    Verbal consent to manage patient's drug therapy was obtained from the patient. They were informed they may decline to participate or withdraw from participation in pharmacy services at any time.

## 2025-04-03 DIAGNOSIS — Z00.00 PREVENTATIVE HEALTH CARE: Primary | ICD-10-CM

## 2025-04-10 LAB — MEV IGG SER IA-ACNC: >300 AU/ML

## 2025-04-19 ENCOUNTER — OFFICE VISIT (OUTPATIENT)
Dept: URGENT CARE | Age: 65
End: 2025-04-19
Payer: MEDICARE

## 2025-04-19 VITALS
BODY MASS INDEX: 40.98 KG/M2 | TEMPERATURE: 98.5 F | SYSTOLIC BLOOD PRESSURE: 125 MMHG | WEIGHT: 255 LBS | RESPIRATION RATE: 16 BRPM | OXYGEN SATURATION: 97 % | HEIGHT: 66 IN | HEART RATE: 85 BPM | DIASTOLIC BLOOD PRESSURE: 73 MMHG

## 2025-04-19 DIAGNOSIS — J01.90 ACUTE SINUSITIS, RECURRENCE NOT SPECIFIED, UNSPECIFIED LOCATION: Primary | ICD-10-CM

## 2025-04-19 DIAGNOSIS — R68.89 FLU-LIKE SYMPTOMS: ICD-10-CM

## 2025-04-19 RX ORDER — AMOXICILLIN AND CLAVULANATE POTASSIUM 875; 125 MG/1; MG/1
875 TABLET, FILM COATED ORAL 2 TIMES DAILY
Qty: 20 TABLET | Refills: 0 | Status: SHIPPED | OUTPATIENT
Start: 2025-04-19 | End: 2025-04-29

## 2025-04-19 ASSESSMENT — ENCOUNTER SYMPTOMS
SHORTNESS OF BREATH: 0
CHILLS: 0
COUGH: 1
SORE THROAT: 1
DIAPHORESIS: 0
RHINORRHEA: 1
FEVER: 0

## 2025-04-19 NOTE — PATIENT INSTRUCTIONS
take antihistamine during the day, ex zyrtec, claritin, etc, for nasal congestion/runny nose.    use saline nasal spray, for nasal congestion/runny nose.    take decongestant at night ex, tabitha seltzer cold pills, robitussin, delsym, etc, for nasal congestion/runny nose.    follow up with primary care if no improvement in 3 - 4 days.    finish all antibiotics, augmentin.

## 2025-04-19 NOTE — PROGRESS NOTES
"Subjective   Patient ID: Steven Velasquez is a 65 y.o. male. They present today with a chief complaint of Cough (Pt was seen here at the end of March w/ flu sx given antibiotic.  Since then still has productive cough, runny nose, sinus drainage.).    History of Present Illness  Nasal congestion/rhinorrhea, dry cough, sore throat (worse in the am) x 3 days    Denies fever, chills, sweats, chest pain, SOB, ear pain,     Hx of environmental allergies.     No hx asthma, smoking.    Here on 3/21, dx cough/bronchitis, rx doxy, albuterol, tessalon perles, prednisone, resolved.       ;]\=[po                Past Medical History  Allergies as of 04/19/2025 - Reviewed 04/19/2025   Allergen Reaction Noted    Methocarbamol Hives and Unknown 09/23/2013    Methylisothiazolinone Unknown 04/29/2024    Primidone Other 06/09/2014       Prescriptions Prior to Admission[1]     Medical History[2]    Surgical History[3]     reports that he quit smoking about 45 years ago. His smoking use included cigarettes. He has been exposed to tobacco smoke. He has never used smokeless tobacco. He reports that he does not currently use alcohol. He reports that he does not use drugs.    Review of Systems  Review of Systems   Constitutional:  Negative for chills, diaphoresis and fever.   HENT:  Positive for congestion, rhinorrhea and sore throat. Negative for ear pain.    Respiratory:  Positive for cough. Negative for shortness of breath.    Cardiovascular:  Negative for chest pain.   All other systems reviewed and are negative.                                 Objective    Vitals:    04/19/25 1451   BP: 125/73   Pulse: 85   Resp: 16   Temp: 36.9 °C (98.5 °F)   SpO2: 97%   Weight: 116 kg (255 lb)   Height: 1.676 m (5' 6\")     No LMP for male patient.    Physical Exam  Vitals and nursing note reviewed.   Constitutional:       General: He is not in acute distress.  HENT:      Nose: Congestion and rhinorrhea present.      Mouth/Throat:      Pharynx: No " oropharyngeal exudate or posterior oropharyngeal erythema.   Cardiovascular:      Rate and Rhythm: Normal rate and regular rhythm.   Pulmonary:      Effort: Pulmonary effort is normal.      Breath sounds: Normal breath sounds.   Neurological:      Mental Status: He is alert.         Procedures    Point of Care Test & Imaging Results from this visit  No results found for this visit on 04/19/25.   Imaging  No results found.    Cardiology, Vascular, and Other Imaging  No other imaging results found for the past 2 days      Diagnostic study results (if any) were reviewed by Gabbie Alfred PA-C.    Assessment/Plan   Allergies, medications, history, and pertinent labs/EKGs/Imaging reviewed by Gabbie Alfred PA-C.     Orders and Diagnoses  There are no diagnoses linked to this encounter.    Medical Admin Record      Patient disposition: Home    Electronically signed by Gabbie Alfred PA-C  2:56 PM           [1] (Not in a hospital admission)  [2]   Past Medical History:  Diagnosis Date    Anaphylactic shock, unspecified, subsequent encounter 05/10/2018    Anaphylaxis, subsequent encounter    Elevated blood-pressure reading, without diagnosis of hypertension 06/24/2020    Elevated blood pressure reading without diagnosis of hypertension    Personal history of diseases of the blood and blood-forming organs and certain disorders involving the immune mechanism     History of polycythemia    Personal history of other specified conditions 11/13/2015    History of epigastric pain   [3]   Past Surgical History:  Procedure Laterality Date    OTHER SURGICAL HISTORY  09/19/2016    Tenotomy Lateral Epicondyle Elbow Open With Tendon Reattachment    OTHER SURGICAL HISTORY  09/19/2016    Palatopharyngoplasty    OTHER SURGICAL HISTORY  10/06/2021    Transforaminal lumbar interbody fusion    OTHER SURGICAL HISTORY  10/06/2021    Lumbar laminectomy

## 2025-05-02 ENCOUNTER — OFFICE VISIT (OUTPATIENT)
Dept: SLEEP MEDICINE | Facility: CLINIC | Age: 65
End: 2025-05-02
Payer: MEDICARE

## 2025-05-02 VITALS
SYSTOLIC BLOOD PRESSURE: 118 MMHG | BODY MASS INDEX: 40.18 KG/M2 | DIASTOLIC BLOOD PRESSURE: 77 MMHG | OXYGEN SATURATION: 96 % | HEIGHT: 67 IN | HEART RATE: 82 BPM | WEIGHT: 256 LBS

## 2025-05-02 DIAGNOSIS — G47.33 OBSTRUCTIVE SLEEP APNEA: Primary | ICD-10-CM

## 2025-05-02 DIAGNOSIS — G47.01 INSOMNIA DUE TO MEDICAL CONDITION: ICD-10-CM

## 2025-05-02 DIAGNOSIS — E66.01 MORBID OBESITY (MULTI): ICD-10-CM

## 2025-05-02 PROCEDURE — 3008F BODY MASS INDEX DOCD: CPT | Performed by: NURSE PRACTITIONER

## 2025-05-02 PROCEDURE — 99214 OFFICE O/P EST MOD 30 MIN: CPT | Performed by: NURSE PRACTITIONER

## 2025-05-02 PROCEDURE — G2211 COMPLEX E/M VISIT ADD ON: HCPCS | Performed by: NURSE PRACTITIONER

## 2025-05-02 PROCEDURE — 3078F DIAST BP <80 MM HG: CPT | Performed by: NURSE PRACTITIONER

## 2025-05-02 PROCEDURE — 3074F SYST BP LT 130 MM HG: CPT | Performed by: NURSE PRACTITIONER

## 2025-05-02 PROCEDURE — 1036F TOBACCO NON-USER: CPT | Performed by: NURSE PRACTITIONER

## 2025-05-02 ASSESSMENT — SLEEP AND FATIGUE QUESTIONNAIRES
HOW LIKELY ARE YOU TO NOD OFF OR FALL ASLEEP WHILE SITTING AND READING: MODERATE CHANCE OF DOZING
HOW LIKELY ARE YOU TO NOD OFF OR FALL ASLEEP WHILE WATCHING TV: MODERATE CHANCE OF DOZING
HOW LIKELY ARE YOU TO NOD OFF OR FALL ASLEEP WHEN YOU ARE A PASSENGER IN A CAR FOR AN HOUR WITHOUT A BREAK: WOULD NEVER DOZE
HOW LIKELY ARE YOU TO NOD OFF OR FALL ASLEEP WHILE SITTING AND TALKING TO SOMEONE: WOULD NEVER DOZE
SLEEP_PROBLEM_NOTICEABLE_TO_OTHERS: SOMEWHAT
WAKING_TOO_EARLY: MODERATE
DIFFICULTY_STAYING_ASLEEP: MODERATE
SITING INACTIVE IN A PUBLIC PLACE LIKE A CLASS ROOM OR A MOVIE THEATER: SLIGHT CHANCE OF DOZING
ESS-CHAD TOTAL SCORE: 5
HOW LIKELY ARE YOU TO NOD OFF OR FALL ASLEEP IN A CAR, WHILE STOPPED FOR A FEW MINUTES IN TRAFFIC: WOULD NEVER DOZE
HOW LIKELY ARE YOU TO NOD OFF OR FALL ASLEEP WHILE LYING DOWN TO REST IN THE AFTERNOON WHEN CIRCUMSTANCES PERMIT: WOULD NEVER DOZE
WORRIED_DISTRESSED_DUE_TO_SLEEP: A LITTLE
SLEEP_PROBLEM_INTERFERES_DAILY_ACTIVITIES: NOT AT ALL NOTICEABLE
HOW LIKELY ARE YOU TO NOD OFF OR FALL ASLEEP WHILE SITTING QUIETLY AFTER LUNCH WITHOUT ALCOHOL: WOULD NEVER DOZE
SATISFACTION_WITH_CURRENT_SLEEP_PATTERN: SATISFIED

## 2025-05-02 ASSESSMENT — PAIN SCALES - GENERAL: PAINLEVEL_OUTOF10: 0-NO PAIN

## 2025-05-02 NOTE — PATIENT INSTRUCTIONS
Advised to increase PAP usage  New device ordered to wear PAP in den  Update supplies regularly             [facility]  [facility] [address]  [address] [address]       NAME: Steven Velasquez   DATE:  05/02/25    DIAGNOSIS:   1. Obstructive sleep apnea  Positive Airway Pressure (PAP) Therapy    CANCELED: Positive Airway Pressure (PAP) Therapy      2. Insomnia due to medical condition            Thank you for coming to the Sleep Medicine Clinic today! Your sleep medicine provider today was: ROHIT Pereira Below is a summary of your treatment plan, other important information, and our contact numbers:    TREATMENT PLAN:   - Follow-up in 2-3 months.  - If not already done, sign up for 'My Chart' and send prescription requests or messages through this    Annual Reminders About Your Sleep Apnea    Your sleep apnea is well controlled based on reviewing your PAP Data Report.     General Reminders:  Continue current machine settings. Continue using machine every night. Need at least 4 hours daily usage.   Remember to clean your mask, tubings, and water chamber regularly as instructed.  Know the replacement schedule of your supplies/ accessories and contact your DME (durable medical equipment) provider if you are due for them.  Avoid driving or operating heavy machinery when drowsy. A person driving while sleepy is 5 times more likely to have an accident. If you feel sleepy, pull over and take a short power nap (sleep for less than 30 minutes). Otherwise, ask somebody to drive you.    Follow-up sooner through MyChart or calling our office if you have any of the following symptoms:  Snoring or stopping breathing while using the machine  Recurrence of fatigue, sleepiness, insomnia, and other symptoms you had prior using machine  Persistent or worsening fatigue or sleepiness despite regular use of machine  Issues tolerating the  machine like bloating sensation, air hunger, too much hot air, too much pressure, taking off mask without recall in the middle of sleep, etc.     Other conservative measures to improve sleep apnea:  Losing weight can lead to some improvement of EDUARDO which means you will need lower pressures in machine to control your EDUARDO. In some patients, they don't need to use the machine after bariatric surgery. Hence, consider medical and/or surgical weight loss especially if your BMI is more than 35.  Avoiding alcohol, sedative-hypnotics, and sedating medications is imperative as these substances can worsen snoring and sleep apnea  If you have nasal congestion or seasonal allergies, improving your breathing through the nose is critical for treating sleep apnea, tolerating CPAP, and improving your sleep; hence, using intranasal steroid spray like Flonase might help. Make sure you know the proper way to use it.  Stay off your back when sleeping.    Common issues with PAP machine:  Mask gets dislodged when turning to the side: Consider getting a CPAP pillow or switching to a mask with hose on top.     Dry mouth:  Your machine has built-in humidifier that heats up the air to prevent dry mouth. It can be adjusted to your comfort. You can try that first and increase setting one level one night at a time to check which setting is comfortable and effective in lessening dry mouth. If dry mouth persists despite humidity setting adjustment, may apply OTC Biotene gel over the gums at bedtime.  If Biotene gel is not effective, consider trying XEROSTOM gel from [employer].  Also, eliminate or reduce dose of meds that can cause dry mouth if possible. Lastly, may try getting a separate room humidifier machine.    Airleaks: Please call DME as they may need to adjust your mask or refit you with a different kind of mask. In addition, you can ask DME for tips on getting a good mask seal and mask fit.     Difficulty tolerating the mask: Contact your  "DME to try a different kind of mask and/or call office to get a referral to Sleep Psychologist for CPAP desensitization. CPAP desensitization technique is a set of strategies that helps patient cope with claustrophobia and anxiety related to wearing mask. Alternatively, we can do a daytime mini-sleep study called PAP-nap trial wherein you will try on different kinds of mask and the sleep technician will try different pressure settings on CPAP and BPAP machines to see which specific pressure is tolerable and comfortable for you.     Water droplets or moisture within the hose and/or mask: This is called rain-out and it is caused by condensation of too much heated humidity on the cooler walls of the hose. If you have rain-out, turn down humidity settings or get a heated hose. If you already have a heated hose, turn up the \"tube temperature\" of the heated hose. Alternatively, if you don't want to get a heated hose or warmer air, may wrap the CPAP hose with stockings to keep it somewhat warm. Also, you need to place the machine on the floor and lower the hose so that water won't travel upward towards your mask.     You can also go to the following EDUCATION WEBSITES for further information:   American Academy of Sleep Medicine http://sleepeducation.org  National Sleep Foundation: https://sleepfoundation.org  American Sleep Apnea Association: https://www.sleepapnea.org (for patients with sleep apnea)    Here at [facility], we wish you a restful sleep!     Instructions - Common EDUARDO Recs: - For your sleep apnea, continue to use your PAP every night and use it whenever you are sleeping.   - Avoid alcohol or sedatives several hours prior to sleeping.   - Get additional supplies for your PAP (e.g., mask, hose, filters) every 3 months or as your insurance allows from your DME company. Replacement cushions for your PAP mask can be requested monthly if airseals are an issue.  - Remember to clean your mask, tubings, and " water chamber regularly as instructed.  - Avoid driving or operating heavy machinery when drowsy. A person driving while sleepy is five (5) times more likely to have an accident. If you feel sleepy, pull over and take a short power nap (sleep for less than 30 minutes). Otherwise, ask somebody to drive you.    EASY WAYS TO IMPROVE YOUR SLEEP:  1. Go to bed and wake up at the same time every day.   Aim for 8 hours but some people need less, some need more.   Get out of bed if you are not sleeping.   Limit naps to 20 min or less.   2. Expose yourself to daylight and/ or bright light in the morning.   Go outside or spend time near a window each morning.   You can use a light box (found on Amazon) if you wake before the sunrise.   Limit light exposure in the evenings (including electronic usage).   Try meditation, reading, stretching, deep breathing, warm shower or bath, or yoga nidra as part of your bedtime routine. There are many great FREE, videos or audio tracks on [employer]/ [employer]Minds, etc for guidance.  3. Exercise, in some form, EVERY day, but not too close to bedtime. Consider making this part of your routine at the start of your day, followed by a cool shower.  4. Eat meals at roughly the same time every day. Make sure you are prioritizing fruits, vegetables, whole grains, lean proteins.  5. Time your caffeine intake. Make sure you are not drinking caffeine within 8 to 12 hours prior to your bedtime.   6. Avoid marijuana, alcohol, and nicotine. They will reduce sleep quality in any quantity.  7. Learn to manage anxiety. Psychology services at  can be reached at [phone] to schedule an appointment.     IMPORTANT INFORMATION:     Call 911 for medical emergencies.  Our offices are generally open from Monday-Friday, 9 am - 5 pm.  If you need to get in touch with me, you may either call me and my team(number is below) or you can use [employer].  If a referral for a test, for CPAP, or for another specialist was made,  and you have not heard about scheduling this within a week, please call scheduling at [phone] ([phone]).  If you are unable to make your appointment for clinic or an overnight study, kindly call the office at least 48 hours in advance to cancel and reschedule.  If you are on CPAP, please bring your device's card to each clinic appointment unless told otherwise by your provider.  There are no supporting services by either the sleep doctors or their staff on weekends and Holidays, or after 5 PM on weekdays.   If you have been asked to come to a sleep study, make sure you bring toiletries, a comfy pillow, and any nighttime medications that you may regularly take. Also be sure to eat dinner before you arrive. We generally do not provide meals.      PRESCRIPTIONS:  We require 7 days advanced notice for prescription refills. If we do not receive the request in this time, we cannot guarantee that your medication will be refilled in time. Please contact the sleep nurses listed below for refills or request via [employer].     IMPORTANT PHONE NUMBERS:   Sleep Medicine Clinic Fax: [phone]  Appointments (for Pediatric Sleep Clinic): [phone] - option 1  Appointments (for Adult Sleep Clinic): [phone] - option 2  Appointments (For Sleep Studies): [phone] - option 3  Behavioral Sleep Medicine: [phone]  Sleep Surgery: [phone]  ENT (Otolaryngology): [phone]  Headache Clinic (Neurology): [phone]  Neurology: [phone]  Psychiatry: [phone]  Pulmonary Function Testing (PFT) Center: [phone]  Pulmonary Medicine: [phone]  [employer] (DME): [phone]  [employer] (DME): [phone]  [facility] ([facility] [place] [phone]    Our Adult Sleep Medicine Team (Please do not hesitate to call the office or sleep nurse with any questions between appointments):    Adult Sleep Nurses (Crystal Ramirez RN and Fe Acevedo RN):  For clinical questions  and refilling prescriptions: [phone]  Email sleep diaries and other documents at: [email or web address]    Adult Sleep Medicine Secretaries:  Ángela Mckeon (For Sabas/Reveles/Krise/Strohl/Yeh): P: [phone]  F: [phone]  Prashant Gr ([place])Office: [phone] option 4 Office Fax: [phone]  Khadra Dennis (For Cain): P: [phone]  Fax: [phone]  Roberta Lestermateo (For Jurcevic/Blank): P: [phone]  F: [phone]  Eda Stafford (For Noris): P: [phone]  F: [phone]  Bonnie Johnson (For Andria/Kirt/Zakhary): P: [phone]  F: [phone]  Nora Ascencio (For [place]): P: [phone]  F: [phone]     Adult Sleep Medicine Advanced Practice Providers:  Javier Hurst ([place], [place])  Daniela Moralez ([employer])  Ghada Davis CNP ([place], [place], [place])  Mandi Machuca CNP ([place], [place], [place])  Melinda Hamm ([facility], [place])  Shelby Fulton CNP ([facility])      Our Sleep Testing Center (STC) Locations:  Our team will contact you to schedule your sleep study, however, you can contact us as follow:  Main Phone Line (scheduling only): [phone] ([phone]), option 3  Adult and Pediatric Locations  [facility] (6 years and older): [facility] - 4th floor ([address] [place] [address]) After hours line: [phone]  [facility] (Main campus: All ages): [facility], 6th floor. After hours line: [phone]  [facility] (5 years and older; younger considered on case-by-case basis): [address]; [address], [address]. Scheduling  After hours line: [phone]   [place] (6 years and older): [address]; [address]; [address]   [place] (6 years and older): [address] [facility], [address]  After hours line: [phone]   [place] (13 years and older) in [place]: [address], 2nd floor  After hours line:  "[phone]   [place] (13 year and older): [address]  After hours line: [phone] (Home studies out of [facility])    Adult Only Locations:   [place] (18 years and older): [address] [facility], 2nd floor   [place] (18 years and older): [address] [facility]; 4th floor  After hours line: [phone]  [facility] (18 years and older) at [place]: [address] [facility]  After hours line: [phone]        CONTACTING YOUR SLEEP MEDICINE PROVIDER:  Send a message directly to your provider through \"My Chart\", which is the email service through your  Records Account: [email or web address]   Call [phone] and leave a message. One of the administrative assistants will forward the message to your sleep medicine provider through \"My Chart\" and/or email.     Your sleep medicine provider for this visit was: RAFI Pereira-CNP    In the event that you are running more than 15 minutes late to your appointment, I will kindly ask you to reschedule.       "

## 2025-05-02 NOTE — PROGRESS NOTES
Patient: Olegario Velasquez    [id number]  : 1960 -- AGE 65 y.o.    Provider: ROHIT Pereira     Location [facility]   Service Date: 2025              [facility]  Followup Visit Note      HISTORY OF PRESENT ILLNESS       HISTORY OF PRESENT ILLNESS   Olegario Velasquez is a 65 y.o. male with past medical history of anaphylaxis, DM2, BPH, ED, hypercholesterolemia, insomnia, lumbar radiculopathy, obesity, EDUARDO, parkinson's, plantar fasciitis, prediabetes who presents to a [facility] for followup. OLEGARIO is retired.     PAST SLEEP HISTORY  OLEGARIO has had a sleep study completed on 2019 at  Sleep lab showing severe EDUARDO with an AHI of 111.9 and SpO2 angela of 88%. CPAP was titrated from 5 to 11. At 10 cwp, the AHI was 0.9 and SpO2 angela was 90%. Recommendation was for auto CPAP at 5-15 with P10 NP.     CURRENT SLEEP HISTORY    On today's visit, the patient reports tolerating PAP well. Notes the mask he is using is much more comfortable. He is getting supplies through [facility] [employer] now. No issues with their service.   Denies RLS or RBD symptoms  Notes he is trying to get more sleep but he often gets a second wind later in the evening and often stays up  Watches TV prior to bed    RLS Followup:  denies    Insomnia follow up:  Bedtime: 2-3 am  Sleep Latency: 15-20 min  Awakenings:   Wake time: 7-9 AM am  Naps:   sometimes 5 pm    ESS: 5   CABRERA: 9  FOSQ: 40    REVIEW OF SYSTEMS     REVIEW OF SYSTEMS  Review of Systems   All other systems reviewed and are negative.    ALLERGIES AND MEDICATIONS     ALLERGIES  Allergies   Allergen Reactions    Methocarbamol Hives and Unknown    Methylisothiazolinone Unknown    Primidone Other     Fatigue       MEDICATIONS  Current Outpatient Medications   Medication Sig Dispense Refill    amantadine (Symmetrel) 100 mg tablet Take 1 tablet (100 mg) by mouth 3 times a day.       apple cider vinegar 500 mg tablet Take 500 mg by mouth 3 times a day.      betamethasone dipropionate 0.05 % cream 1 Application.      cholecalciferol (Vitamin D-3) 5,000 Units tablet Take 1 tablet (125 mcg) by mouth once daily.      chromium picolinate 200 mcg tablet tablet Take 1 tablet (200 mcg) by mouth 3 times a day.      coenzyme Q-10 100 mg capsule Take 1 capsule (100 mg) by mouth once daily.      cyclobenzaprine (Flexeril) 5 mg tablet Take 1 tablet (5 mg) by mouth 2 times a day as needed for muscle spasms. 180 tablet 3    gabapentin (Neurontin) 300 mg capsule Take 4 tabs nightly 360 capsule 3    hydrocortisone 2.5 % cream 1 Application.      melatonin 5 mg tablet Take 3 tablets (15 mg) by mouth as needed at bedtime for sleep.      metroNIDAZOLE (Metrogel) 1 % gel 1 Application.      multivit-iron-minerals-folic acid (Centrum Silver) 0.4 mg-300 mcg- 250 mcg tab Take 1 tablet by mouth once daily.      Myrbetriq 50 mg tablet extended release 24 hr 24 hr tablet Take 1 tablet (50 mg) by mouth once daily.      nicotinamide riboside chloride (NICOTINAMIDE RIBOSIDE, BULK, MISC) Take 300 mg by mouth once daily.      polyethylene glycol (Glycolax) 17 gram/dose powder Mix 17 g of powder and drink once daily.      pramipexole (Mirapex) 0.125 mg tablet Take 1 tablet (0.125 mg) by mouth 3 times a day.      pramipexole (Mirapex) 0.5 mg tablet Take 1 tablet (0.5 mg) by mouth 3 times a day.      pravastatin (Pravachol) 40 mg tablet take 1 tablet by mouth once daily 90 tablet 3    saw palmetto 450 mg capsule Take 3 capsules (1,350 mg) by mouth 3 times a day.      tadalafil (Cialis) 5 mg tablet Take 1 tablet (5 mg) by mouth once daily as needed for erectile dysfunction.      tirzepatide (Mounjaro) 15 mg/0.5 mL pen injector Inject 15 mg under the skin every 7 days. 6 mL 3    trihexyphenidyl (Artane) 2 mg tablet Take 0.5 tablets (1 mg) by mouth 3 times a day. 135 tablet 3    zolpidem (Ambien) 10 mg tablet Take 1 tablet (10  "mg) by mouth as needed at bedtime for sleep.       No current facility-administered medications for this visit.       PPAST MEDICAL HISTORY  Past Medical History:   Diagnosis Date    Anaphylactic shock, unspecified, subsequent encounter 05/10/2018    Anaphylaxis, subsequent encounter    Elevated blood-pressure reading, without diagnosis of hypertension 06/24/2020    Elevated blood pressure reading without diagnosis of hypertension    Personal history of diseases of the blood and blood-forming organs and certain disorders involving the immune mechanism     History of polycythemia    Personal history of other specified conditions 11/13/2015    History of epigastric pain       PAST SURGICAL HISTORY:  Past Surgical History:   Procedure Laterality Date    OTHER SURGICAL HISTORY  09/19/2016    Tenotomy Lateral Epicondyle Elbow Open With Tendon Reattachment    OTHER SURGICAL HISTORY  09/19/2016    Palatopharyngoplasty    OTHER SURGICAL HISTORY  10/06/2021    Transforaminal lumbar interbody fusion    OTHER SURGICAL HISTORY  10/06/2021    Lumbar laminectomy       FAMILY HISTORY  Family History   Problem Relation Name Age of Onset    Osteoarthritis Mother      Osteoporosis Mother      Lung cancer Father      Parkinsonism Mother's Sister         FAMILY HISTORY: No changes since previous visit. Otherwise non-contributory as charted.       SOCIAL HISTORY  He  reports that he quit smoking about 45 years ago. His smoking use included cigarettes. He has been exposed to tobacco smoke. He has never used smokeless tobacco. He reports that he does not currently use alcohol. He reports that he does not use drugs.       PHYSICAL EXAM     VITAL SIGNS: /77 (BP Location: Left arm, Patient Position: Sitting, BP Cuff Size: Large adult)   Pulse 82   Ht 1.689 m (5' 6.5\")   Wt 116 kg (256 lb)   SpO2 96%   BMI 40.70 kg/m²      PREVIOUS WEIGHTS:  Wt Readings from Last 3 Encounters:   05/02/25 116 kg (256 lb)   04/19/25 116 kg (255 lb) "   03/21/25 116 kg (255 lb)       Physical Exam  Physical Exam   Constitutional: Alert and oriented, cooperative, no obvious distress   HEENT: Non icteric or anemic, EOM WNL bilaterally   Neck: Supple, no JVD, no goiter, no adenopathy, no rigidity  Chest: CTA bilaterally, no wheezing, crackles, rubs   Cardiac: RRR, S1 and S2, no murmur, rub, thrill   Abdomen: Obese, Soft, nontender, no masses, no organomegaly   Extremities: No clubbing, no LL edema   Neuromuscular: Cranial nerves grossly intact, no focal deficits      RESULTS/DATA     Bicarbonate (mmol/L)   Date Value   11/04/2024 29   10/02/2023 29   04/26/2023 31   10/04/2022 30   06/16/2022 28       PAP Adherence:    Airsense 10 setup 11/22/19    Adrianne NP    Medical [place] -[place]-[facility]    Last supplies 10/20/21      ASSESSMENT/PLAN     Mr. Velasquez is a 65 y.o. male and He returns in followup to the [facility] for EDUARDO.    Problem List and Orders  Problem List Items Addressed This Visit           ICD-10-CM    Insomnia due to medical condition G47.01    Has PRN medications which are helpful  Discussed strategies to optimize sleep time and phase. He verbalized understanding         Morbid obesity (Multi) E66.01    Weight loss recommended  Follow up with PCP for recommendations           Obstructive sleep apnea - Primary G47.33    11/11/2019 at  Sleep lab showing severe EDUARDO with an AHI of 111.9 and SpO2 angela of 88%. CPAP was titrated from 5 to 11. At 10 cwp, the AHI was 0.9 and SpO2 angela was 90%. Recommendation was for auto CPAP at 5-15 with P10 NP  Well controlled on 6-10 EPR of 3 via MSC but compliance is subpar. Advised to wear CPAP longer. Reports 4-8 hours of sleep per night, some sleep in his den before he moves to the bedroom. Interested in a second machine to have two available. States he will wear if he has one on the den as well.   Supply order updated today via STS with request for new machine  Advised to replace  equipment regularly. Pillow and headgear appear worn today.   Follow up in 2-3 mo for compliance            Relevant Orders    Positive Airway Pressure (PAP) Therapy         Disposition    Return to clinic in 3-6 months

## 2025-05-02 NOTE — ASSESSMENT & PLAN NOTE
11/11/2019 at  Sleep lab showing severe EDUARDO with an AHI of 111.9 and SpO2 angela of 88%. CPAP was titrated from 5 to 11. At 10 cwp, the AHI was 0.9 and SpO2 angela was 90%. Recommendation was for auto CPAP at 5-15 with P10 NP  Well controlled on 6-10 EPR of 3 via MSC but compliance is subpar. Advised to wear CPAP longer. Reports 4-8 hours of sleep per night, some sleep in his den before he moves to the bedroom. Interested in a second machine to have two available. States he will wear if he has one on the den as well.   Supply order updated today via STS with request for new machine  Advised to replace equipment regularly. Pillow and headgear appear worn today.   Follow up in 2-3 mo for compliance

## 2025-05-15 ENCOUNTER — PATIENT MESSAGE (OUTPATIENT)
Dept: SLEEP MEDICINE | Facility: CLINIC | Age: 65
End: 2025-05-15
Payer: MEDICARE

## 2025-05-22 ENCOUNTER — OFFICE VISIT (OUTPATIENT)
Dept: VASCULAR SURGERY | Facility: CLINIC | Age: 65
End: 2025-05-22
Payer: MEDICARE

## 2025-05-22 VITALS
BODY MASS INDEX: 41.19 KG/M2 | OXYGEN SATURATION: 99 % | WEIGHT: 256.3 LBS | SYSTOLIC BLOOD PRESSURE: 118 MMHG | HEIGHT: 66 IN | DIASTOLIC BLOOD PRESSURE: 76 MMHG | HEART RATE: 84 BPM

## 2025-05-22 DIAGNOSIS — I87.2 VENOUS (PERIPHERAL) INSUFFICIENCY: ICD-10-CM

## 2025-05-22 DIAGNOSIS — I83.013 VENOUS ULCER OF ANKLE, RIGHT: Primary | ICD-10-CM

## 2025-05-22 DIAGNOSIS — M79.89 SWELLING OF BOTH LOWER EXTREMITIES: ICD-10-CM

## 2025-05-22 DIAGNOSIS — L97.319 VENOUS ULCER OF ANKLE, RIGHT: Primary | ICD-10-CM

## 2025-05-22 PROCEDURE — 99213 OFFICE O/P EST LOW 20 MIN: CPT | Performed by: SURGERY

## 2025-05-22 PROCEDURE — 3074F SYST BP LT 130 MM HG: CPT | Performed by: SURGERY

## 2025-05-22 PROCEDURE — 3078F DIAST BP <80 MM HG: CPT | Performed by: SURGERY

## 2025-05-22 PROCEDURE — 99203 OFFICE O/P NEW LOW 30 MIN: CPT | Performed by: SURGERY

## 2025-05-22 PROCEDURE — 3008F BODY MASS INDEX DOCD: CPT | Performed by: SURGERY

## 2025-05-22 ASSESSMENT — ENCOUNTER SYMPTOMS
LOSS OF SENSATION IN FEET: 1
DEPRESSION: 0
OCCASIONAL FEELINGS OF UNSTEADINESS: 1

## 2025-05-22 ASSESSMENT — PAIN SCALES - GENERAL: PAINLEVEL_OUTOF10: 0-NO PAIN

## 2025-05-22 NOTE — PROGRESS NOTES
NPV REASON: calf wound  Referring Provider: Dr. Mcbride    CURRENT ENCOUNTER:  Steven Velasquez is 65 y.o. male here for NPV for CVI; RLE wound.  BMI 41  Calf wound - right  Wound there for 1 week    Patient had a RLE and LLE ablation done at the end of August with no improvement in swelling     Has daily edema  Sometimes wears his medical grade compression socks  Wears them about 2-3 days per week    Right leg wound started about 1 week ago;  LDS changes started about 1 year ago.   Left leg with LDS changes in the last year    Using medical grade compression stockings: inconsistently  Previous vein procedures: bilateral GSV ablation and leg sclero to both around fall of 2024  Previous phlebitis/DVT/PE: no  Previous venous ulcers: no  Family hx of varicose veins: mother      RIGHT LEG C6 Active Venous Ulcer  RIGHT LEG VENOUS EDEMA 2, SKIN PIGMENTATION 2, INFLAMMATION 1, INDURATION 1, ACTIVE ULCER NUMBER 1, ACTIVE ULCER DURATION 1, ACTIVE ULCER SIZE 1, and USE OF COMPRESSION 2  RIGHT LEG CEAP: C6  RIGHT LEG VCSS SCORE: 11    LEFT LEG C4b Lipodermatosclerosis or atrophie maria guadalupe  LEFT LEG VENOUS EDEMA 2, SKIN PIGMENTATION 1, INFLAMMATION 1, and USE OF COMPRESSION 2  LEFT LEG CEAP: C4b  LEFT LEG VCSS SCORE: 6      PastMedHX:  Medical History[1]    Meds:   Current Medications[2]    Allergies:   RX Allergies[3]    ROS:  Review of Systems     Objective:  Vitals:  Vitals:    05/22/25 1009   BP: 118/76   Pulse:    SpO2:         Exam:  No distress  Breathing comfortably   Not tachycardic  Abd soft, nt, nd  Bilateral legs with intact pulses   Bilateral perfused feet  1+ leg edema  Right posterior calf wound                    Labs:  Lab Results   Component Value Date    WBC 6.8 11/04/2024    WBC 7.5 08/08/2024    WBC 10.5 10/02/2023    HGB 16.8 11/04/2024    HGB 16.6 08/08/2024    HGB 17.4 10/02/2023    HCT 50.6 11/04/2024    HCT 49.6 08/08/2024    HCT 52.9 (H) 10/02/2023    MCV 92 11/04/2024    MCV 89 08/08/2024    MCV  93 10/02/2023     11/04/2024     Lab Results   Component Value Date    CREATININE 1.39 (H) 11/04/2024    CREATININE 1.27 10/02/2023    CREATININE 1.35 (H) 04/26/2023    BUN 20 11/04/2024    BUN 24 (H) 10/02/2023    BUN 25 (H) 04/26/2023     11/04/2024     10/02/2023     04/26/2023    K 4.3 11/04/2024    K 4.5 10/02/2023    K 4.0 04/26/2023     11/04/2024     10/02/2023     04/26/2023    CO2 29 11/04/2024    CO2 29 10/02/2023    CO2 31 04/26/2023         Imaging:  None in system    Assessment & Plan:  Steven Velasquez is 65 y.o. male here for NPV for CVI; RLE wound.  BMI 41  Calf wound - right  Wound there for 1 week  LDS changes started about 1 year ago.     RIGHT LEG C6 Active Venous Ulcer  RIGHT LEG VCSS SCORE: 11  LEFT LEG C4b Lipodermatosclerosis or atrophie maria guadalupe  LEFT LEG VCSS SCORE: 6    1) we will start compression therapy with circaid wraps and dressings over the wound  2) we will be referring you to wound center for the left leg   3) we have provided a dressings in the mean time - you can do this daily; also wash the wound with soap and water to keep clean.   4) I can see you back in 3-4 months before repeating studies    Dionicio Last MD, MHS, RPVI  , [facility] [employer] [facility]  Director, [facility], [facility]  Co-Director, Vascular Laboratories, [facility]  Division of Vascular Surgery and Endovascular Therapy  [facility]                 [1]   Past Medical History:  Diagnosis Date    Anaphylactic shock, unspecified, subsequent encounter 05/10/2018    Anaphylaxis, subsequent encounter    Elevated blood-pressure reading, without diagnosis of hypertension 06/24/2020    Elevated blood pressure reading without diagnosis of hypertension    Personal history of diseases of the blood and blood-forming  organs and certain disorders involving the immune mechanism     History of polycythemia    Personal history of other specified conditions 11/13/2015    History of epigastric pain   [2]   Current Outpatient Medications:     amantadine (Symmetrel) 100 mg tablet, Take 1 tablet (100 mg) by mouth 3 times a day., Disp: , Rfl:     apple cider vinegar 500 mg tablet, Take 500 mg by mouth 3 times a day., Disp: , Rfl:     betamethasone dipropionate 0.05 % cream, 1 Application., Disp: , Rfl:     cholecalciferol (Vitamin D-3) 5,000 Units tablet, Take 1 tablet (125 mcg) by mouth once daily., Disp: , Rfl:     chromium picolinate 200 mcg tablet tablet, Take 1 tablet (200 mcg) by mouth 3 times a day., Disp: , Rfl:     coenzyme Q-10 100 mg capsule, Take 1 capsule (100 mg) by mouth once daily., Disp: , Rfl:     cyclobenzaprine (Flexeril) 5 mg tablet, Take 1 tablet (5 mg) by mouth 2 times a day as needed for muscle spasms., Disp: 180 tablet, Rfl: 3    gabapentin (Neurontin) 300 mg capsule, Take 4 tabs nightly, Disp: 360 capsule, Rfl: 3    hydrocortisone 2.5 % cream, 1 Application., Disp: , Rfl:     melatonin 5 mg tablet, Take 3 tablets (15 mg) by mouth as needed at bedtime for sleep., Disp: , Rfl:     metroNIDAZOLE (Metrogel) 1 % gel, 1 Application., Disp: , Rfl:     multivit-iron-minerals-folic acid (Centrum Silver) 0.4 mg-300 mcg- 250 mcg tab, Take 1 tablet by mouth once daily., Disp: , Rfl:     Myrbetriq 50 mg tablet extended release 24 hr 24 hr tablet, Take 1 tablet (50 mg) by mouth once daily., Disp: , Rfl:     nicotinamide riboside chloride (NICOTINAMIDE RIBOSIDE, BULK, MISC), Take 300 mg by mouth once daily., Disp: , Rfl:     polyethylene glycol (Glycolax) 17 gram/dose powder, Mix 17 g of powder and drink once daily., Disp: , Rfl:     pramipexole (Mirapex) 0.125 mg tablet, Take 1 tablet (0.125 mg) by mouth 3 times a day., Disp: , Rfl:     pramipexole (Mirapex) 0.5 mg tablet, Take 1 tablet (0.5 mg) by mouth 3 times a day., Disp:  , Rfl:     pravastatin (Pravachol) 40 mg tablet, take 1 tablet by mouth once daily, Disp: 90 tablet, Rfl: 3    saw palmetto 450 mg capsule, Take 3 capsules (1,350 mg) by mouth 3 times a day., Disp: , Rfl:     tadalafil (Cialis) 5 mg tablet, Take 1 tablet (5 mg) by mouth once daily as needed for erectile dysfunction., Disp: , Rfl:     tirzepatide (Mounjaro) 15 mg/0.5 mL pen injector, Inject 15 mg under the skin every 7 days., Disp: 6 mL, Rfl: 3    trihexyphenidyl (Artane) 2 mg tablet, Take 0.5 tablets (1 mg) by mouth 3 times a day., Disp: 135 tablet, Rfl: 3    zolpidem (Ambien) 10 mg tablet, Take 1 tablet (10 mg) by mouth as needed at bedtime for sleep., Disp: , Rfl:   [3]   Allergies  Allergen Reactions    Methocarbamol Hives and Unknown    Methylisothiazolinone Unknown    Primidone Other     Fatigue

## 2025-05-22 NOTE — PATIENT INSTRUCTIONS
It was a pleasure taking care of you today and appreciate your seeing us at our [facility].     Today's plan is as follows:  1) we will start compression therapy with circaid wraps and dressings over the wound  2) we will be referring you to wound center for the left leg   3) we have provided a dressings in the mean time - you can do this daily; also wash the wound with soap and water to keep clean.   4) I can see you back in 3-4 months before repeating studies      Please call the office with any questions at [phone].   You can speak to our secretaries or our clinical nurses for specific questions.   For Vein Center specific questions, you can also call [phone] or email at [email or web address]  If you need coordinating your appointments and testing you can do these at the  or by calling my office shortly after your visit.        What is Compression Therapy?  Compression therapy refers to stockings or other devices that are specially designed to support your veins and increase circulation in your legs. They improve the signs and symptoms of venous diseases by providing external support to your legs. When the leg veins do not function properly, blood can pool in the veins, causing swelling and other symptoms. Compression therapy can prevent the pooling of blood in the legs thereby decreasing associated symptoms.     How do compression stockings work?  Graduated compression stockings provide external support, starting with stronger compression at the ankles that gradually lessens toward the top of the stocking.  This external support reduces blood pooling in leg veins, thereby helping overall circulation and diminishing leg swelling throughout the day.  This gradual support works in conjunction with the pumping action of the calf muscles, which also assist with circulation, to improve blood return to the heart.    How do I wear compression  stockings?  The stockings are normally worn throughout the day. You put them on in the morning when you get out of bed and remove them before bed at night.  For some people with more advanced vein disease or other causes of leg swelling, your healthcare provider may recommend you wear compression stockings during the night as well.    How do I get compression stockings?  Since legs come in all different shapes, lengths, and circumferences, professional measurement is important for a proper fit.  This maximizes the benefit of the stockings and helps prevent injury or pain from poorly fitted garments. Below is a diagram showing you how to measure your leg to get the right knee or thigh high compression stocking.     The strength or pressure gradient, you should wear is CIRCAID WRAPS - BOTH THE FOOT AND CALF COMPRESSION.      HOW TO MEASURE YOUR COMPRESSION SOCKS:      Resources for compression socks:  [employer]      We appreciate you seeing us at our [facility]  It was a pleasure taking care of you today.  For Vein Center specific questions email at [email or web address]

## 2025-05-28 ENCOUNTER — OFFICE VISIT (OUTPATIENT)
Dept: WOUND CARE | Facility: CLINIC | Age: 65
End: 2025-05-28
Payer: MEDICARE

## 2025-05-28 PROCEDURE — 11042 DBRDMT SUBQ TIS 1ST 20SQCM/<: CPT

## 2025-05-28 PROCEDURE — 99213 OFFICE O/P EST LOW 20 MIN: CPT | Mod: 25

## 2025-06-04 ENCOUNTER — OFFICE VISIT (OUTPATIENT)
Dept: WOUND CARE | Facility: CLINIC | Age: 65
End: 2025-06-04
Payer: MEDICARE

## 2025-06-04 PROCEDURE — 99213 OFFICE O/P EST LOW 20 MIN: CPT

## 2025-06-10 DIAGNOSIS — E78.00 HYPERCHOLESTEROLEMIA: ICD-10-CM

## 2025-06-10 RX ORDER — PRAVASTATIN SODIUM 40 MG/1
40 TABLET ORAL DAILY
Qty: 90 TABLET | Refills: 3 | Status: SHIPPED | OUTPATIENT
Start: 2025-06-10

## 2025-06-11 ENCOUNTER — APPOINTMENT (OUTPATIENT)
Dept: WOUND CARE | Facility: CLINIC | Age: 65
End: 2025-06-11
Payer: MEDICARE

## 2025-06-14 NOTE — PROGRESS NOTES
Subjective   Patient ID: Steven Velasquez is a 65 y.o. male who presents for groin pull    HPI     Review of Systems    Objective   There were no vitals taken for this visit.    Physical Exam    Assessment/Plan   {[email or web address]/[email or web address]:[id number]}

## 2025-06-17 ENCOUNTER — HOSPITAL ENCOUNTER (OUTPATIENT)
Dept: RADIOLOGY | Facility: CLINIC | Age: 65
Discharge: HOME | End: 2025-06-17
Payer: MEDICARE

## 2025-06-17 ENCOUNTER — APPOINTMENT (OUTPATIENT)
Dept: PRIMARY CARE | Facility: CLINIC | Age: 65
End: 2025-06-17
Payer: MEDICARE

## 2025-06-17 VITALS
DIASTOLIC BLOOD PRESSURE: 84 MMHG | BODY MASS INDEX: 41.64 KG/M2 | TEMPERATURE: 96.9 F | SYSTOLIC BLOOD PRESSURE: 110 MMHG | WEIGHT: 258 LBS | HEART RATE: 88 BPM

## 2025-06-17 DIAGNOSIS — N50.812 TESTICULAR PAIN, LEFT: ICD-10-CM

## 2025-06-17 DIAGNOSIS — N18.31 CHRONIC KIDNEY DISEASE, STAGE 3A (MULTI): ICD-10-CM

## 2025-06-17 DIAGNOSIS — E11.9 TYPE 2 DIABETES MELLITUS WITHOUT COMPLICATION, WITHOUT LONG-TERM CURRENT USE OF INSULIN: ICD-10-CM

## 2025-06-17 DIAGNOSIS — R10.32 LEFT GROIN PAIN: Primary | ICD-10-CM

## 2025-06-17 DIAGNOSIS — R10.32 LEFT GROIN PAIN: ICD-10-CM

## 2025-06-17 PROCEDURE — 3079F DIAST BP 80-89 MM HG: CPT | Performed by: INTERNAL MEDICINE

## 2025-06-17 PROCEDURE — 1036F TOBACCO NON-USER: CPT | Performed by: INTERNAL MEDICINE

## 2025-06-17 PROCEDURE — 3074F SYST BP LT 130 MM HG: CPT | Performed by: INTERNAL MEDICINE

## 2025-06-17 PROCEDURE — 99213 OFFICE O/P EST LOW 20 MIN: CPT | Performed by: INTERNAL MEDICINE

## 2025-06-17 PROCEDURE — 76870 US EXAM SCROTUM: CPT

## 2025-06-17 RX ORDER — TIRZEPATIDE 15 MG/.5ML
15 INJECTION, SOLUTION SUBCUTANEOUS
Qty: 6 ML | Refills: 3 | Status: SHIPPED | OUTPATIENT
Start: 2025-06-17 | End: 2026-06-17

## 2025-09-11 ENCOUNTER — APPOINTMENT (OUTPATIENT)
Dept: VASCULAR SURGERY | Facility: CLINIC | Age: 65
End: 2025-09-11
Payer: MEDICARE

## 2025-11-10 ENCOUNTER — APPOINTMENT (OUTPATIENT)
Dept: PRIMARY CARE | Facility: CLINIC | Age: 65
End: 2025-11-10
Payer: MEDICARE

## 2025-12-17 ENCOUNTER — APPOINTMENT (OUTPATIENT)
Dept: RHEUMATOLOGY | Facility: CLINIC | Age: 65
End: 2025-12-17
Payer: MEDICARE